# Patient Record
Sex: FEMALE | Race: WHITE | NOT HISPANIC OR LATINO | Employment: UNEMPLOYED | URBAN - METROPOLITAN AREA
[De-identification: names, ages, dates, MRNs, and addresses within clinical notes are randomized per-mention and may not be internally consistent; named-entity substitution may affect disease eponyms.]

---

## 2017-12-15 ENCOUNTER — LAB REQUISITION (OUTPATIENT)
Dept: LAB | Facility: HOSPITAL | Age: 57
End: 2017-12-15

## 2017-12-15 ENCOUNTER — OFFICE VISIT (OUTPATIENT)
Dept: NEUROLOGY | Facility: CLINIC | Age: 57
End: 2017-12-15

## 2017-12-15 VITALS
DIASTOLIC BLOOD PRESSURE: 68 MMHG | BODY MASS INDEX: 20.89 KG/M2 | WEIGHT: 130 LBS | HEIGHT: 66 IN | SYSTOLIC BLOOD PRESSURE: 132 MMHG

## 2017-12-15 DIAGNOSIS — Z00.00 ROUTINE GENERAL MEDICAL EXAMINATION AT A HEALTH CARE FACILITY: ICD-10-CM

## 2017-12-15 DIAGNOSIS — R41.3 MEMORY LOSS: Primary | ICD-10-CM

## 2017-12-15 PROCEDURE — 99205 OFFICE O/P NEW HI 60 MIN: CPT | Performed by: PHYSICIAN ASSISTANT

## 2017-12-15 PROCEDURE — 36415 COLL VENOUS BLD VENIPUNCTURE: CPT | Performed by: PHYSICIAN ASSISTANT

## 2017-12-15 RX ORDER — POTASSIUM CHLORIDE 750 MG/1
TABLET, FILM COATED, EXTENDED RELEASE ORAL
Refills: 0 | Status: ON HOLD | COMMUNITY
Start: 2017-09-15 | End: 2018-10-02

## 2017-12-15 RX ORDER — PAROXETINE HYDROCHLORIDE 40 MG/1
TABLET, FILM COATED ORAL
Refills: 0 | Status: ON HOLD | COMMUNITY
Start: 2017-11-03 | End: 2018-10-02

## 2017-12-15 NOTE — PROGRESS NOTES
"Subjective     Chief Complaint: memory loss    History of Present Illness   Mary Balderas is a 57 y.o. female who comes to clinic today for evaluation of memory loss . She and her family have noted symptoms since at least 2014 marked initially by forgetfulness and repetitiveness . This has gradually worsened  over time. Additional symptoms have included impairments in both short term and long term memory as well as essentially all spheres of cognition. There have been associated  symptoms of significant anxiety and depression. She notes  impairments in ADL's. Her family manages her medications  and finances. She is no longer driving . She is currently residing with her son.     She has previously been followed at  neurology and by Dr. Ruffin. Prior evaluation has included an MRI of the brain. We do not have these results currently. Neuropsychological testing in 5/17 and 4/15 were consistent with anxiety and depression. She has tried donepezil in the past, though this was not beneficial.     It is noted that her mother and maternal aunt were diagnosed with Alzheimer's Disease with onset of symptoms in their early 30's. Her maternal grandmother was also diagnosed with Alzheimer's Disease, though the onset is unclear.      I have reviewed and confirmed the past family, social and medical history as accurate on 12/15/17.     Review of Systems   Constitutional: Negative.    HENT: Negative.    Eyes: Negative.    Respiratory: Negative.    Cardiovascular: Negative.    Gastrointestinal: Negative.    Endocrine: Negative.    Genitourinary: Negative.    Musculoskeletal: Negative.    Skin: Negative.    Allergic/Immunologic: Negative.    Neurological:        As noted in HPI   Hematological: Negative.    Psychiatric/Behavioral:        As noted in HPI       Objective     /68  Ht 167.6 cm (66\")  Wt 59 kg (130 lb)  BMI 20.98 kg/m2    General appearance today is normal.   Peripheral pulses were present and " symmetric.  The ophthalmoscopic exam today is unremarkable. The discs and posterior elements are unremarkable.      Physical Exam   Neurological: She has normal strength. She has a normal Finger-Nose-Finger Test. Gait normal.   Reflex Scores:       Tricep reflexes are 1+ on the right side and 1+ on the left side.       Bicep reflexes are 1+ on the right side and 1+ on the left side.       Brachioradialis reflexes are 1+ on the right side and 1+ on the left side.       Patellar reflexes are 1+ on the right side and 1+ on the left side.  Psychiatric: Her speech is normal.        Neurologic Exam     Mental Status   Oriented to person.   Disoriented to place. (Oriented to state only  )  Disoriented to time. Disoriented to year, month, date, day and season.   Registration: recalls 3 of 3 objects. Recall at 5 minutes: recalls 1 of 3 objects. Follows 1 step commands.   Attention: decreased.   Speech: speech is normal   Level of consciousness: alert  Unable to name object: 1/2 objects. Able to read. Able to repeat. Unable to write. Normal comprehension.     Cranial Nerves   Cranial nerves II through XII intact.     Motor Exam   Muscle bulk: normal  Overall muscle tone: normal    Strength   Strength 5/5 throughout.     Sensory Exam   Light touch normal.     Gait, Coordination, and Reflexes     Gait  Gait: normal    Coordination   Finger to nose coordination: normal    Tremor   Resting tremor: absent    Reflexes   Right brachioradialis: 1+  Left brachioradialis: 1+  Right biceps: 1+  Left biceps: 1+  Right triceps: 1+  Left triceps: 1+  Right patellar: 1+  Left patellar: 1+        Results  MMSE=9      Assessment/Plan   Mary was seen today for memory loss.    Diagnoses and all orders for this visit:    Memory loss  -     Folate  -     Comprehensive Metabolic Panel  -     TSH  -     CBC & Differential  -     Vitamin B12  -     RPR  -     Ammonia          Discussion/Summary   Mary Balderas comes to clinic today for  evaluation of cognitive impairment. Given her history and examination today, including cognitive bedside testing as well as prior workup including neuropsychological testing, it is likely that her symptoms are relate to underlying anxiety and depression. This was discussed at length with the patient and her family. It was elected to obtain records from , including any neuroimaging. It was also elected to obtain screening blood work today. I strongly recommended that she establish care with psychiatry. I have also asked Dinora Ferris,  to contact the family to discuss potential resources. She will then follow up in our clinic on an as needed basis.       I spent 45 minutes out of 60 minutes face to face with the patient and family and discussing diagnosis, prognosis, diagnostic testing, evaluation, current status, driving, treatment options and management.    As part of this visit I reviewed outside records and obtained additional history from the family which is incorporated in the HPI.      Allison Garcia PA-C

## 2017-12-20 ENCOUNTER — TELEPHONE (OUTPATIENT)
Dept: NEUROLOGY | Facility: CLINIC | Age: 57
End: 2017-12-20

## 2017-12-20 NOTE — TELEPHONE ENCOUNTER
----- Message from Allison Garcia PA-C sent at 12/15/2017 11:41 AM EST -----  Would you care to contact Andrey Redd's son to discuss potential resources? Thanks

## 2017-12-21 ENCOUNTER — TELEPHONE (OUTPATIENT)
Dept: NEUROLOGY | Facility: CLINIC | Age: 57
End: 2017-12-21

## 2017-12-21 NOTE — TELEPHONE ENCOUNTER
Son, Sunny and his therapist, Dinora, called together returning my call about arranging respite care services. I gave them the phone number for the [facility] for the intake person to assess their needs and respite programs which she may be eligible. They asked about next steps in finding a diagnosis and treatment plan. I explained I will talk with Allison and Dr. Macario, she is not currently being followed for anxeity or depression though state Dr. Uribe is treating her for depression. She was referred to [email or web address] for therapy and was found to not be a candidate as she cannot participate in therapy sessions and they do not have a prescribing provider. Son is interested in getting referral for psychiatry here in [place]. He asked about disability because she was fired from her job for cognitive reasons per son. I explained he can call the disability office but they will likely need a dx and information from physician. I will talk with Dr. Macario and Allison further about options and will call them if they agree to send referral for psychiatry. He verbalized understanding.

## 2017-12-27 ENCOUNTER — TELEPHONE (OUTPATIENT)
Dept: NEUROLOGY | Facility: CLINIC | Age: 57
End: 2017-12-27

## 2017-12-27 NOTE — TELEPHONE ENCOUNTER
----- Message from Allison Garcia PA-C sent at 12/27/2017 11:01 AM EST -----  Would you care to let the son know that we would recommend Dr. Jose Enrique Horner at [facility], though we would also recommend any other psychiatrist there as well. If that doesn't work, we would recommend [facility]. Thanks!

## 2017-12-27 NOTE — TELEPHONE ENCOUNTER
Tried to contact son with no answer.  On the 3rd apptemt, I did leave VM about Dr Jose Enrique Horner and their contact info.  Explained for him to try them first and any other questions, to contact us back.

## 2018-09-22 ENCOUNTER — HOSPITAL ENCOUNTER (EMERGENCY)
Facility: HOSPITAL | Age: 58
Discharge: HOME | End: 2018-09-22
Attending: EMERGENCY MEDICINE | Admitting: NURSE PRACTITIONER

## 2018-09-22 VITALS
OXYGEN SATURATION: 98 % | WEIGHT: 150 LBS | RESPIRATION RATE: 18 BRPM | HEIGHT: 65 IN | DIASTOLIC BLOOD PRESSURE: 83 MMHG | SYSTOLIC BLOOD PRESSURE: 140 MMHG | HEART RATE: 77 BPM | BODY MASS INDEX: 24.99 KG/M2 | TEMPERATURE: 97.3 F

## 2018-09-22 DIAGNOSIS — F41.9 SEVERE ANXIETY: Primary | ICD-10-CM

## 2018-09-22 LAB
6-ACETYL MORPHINE: NEGATIVE
ALBUMIN SERPL-MCNC: 4.4 G/DL (ref 3.5–5)
ALBUMIN/GLOB SERPL: 1.3 G/DL (ref 1.5–2.5)
ALP SERPL-CCNC: 127 U/L (ref 35–104)
ALT SERPL W P-5'-P-CCNC: 24 U/L (ref 10–36)
AMPHET+METHAMPHET UR QL: NEGATIVE
ANION GAP SERPL CALCULATED.3IONS-SCNC: 7.9 MMOL/L (ref 3.6–11.2)
APAP SERPL-MCNC: <10 MCG/ML (ref 0–200)
AST SERPL-CCNC: 35 U/L (ref 10–30)
BARBITURATES UR QL SCN: NEGATIVE
BASOPHILS # BLD AUTO: 0.04 10*3/MM3 (ref 0–0.3)
BASOPHILS NFR BLD AUTO: 0.4 % (ref 0–2)
BENZODIAZ UR QL SCN: NEGATIVE
BILIRUB SERPL-MCNC: 0.3 MG/DL (ref 0.2–1.8)
BILIRUB UR QL STRIP: NEGATIVE
BUN BLD-MCNC: 11 MG/DL (ref 7–21)
BUN/CREAT SERPL: 12.4 (ref 7–25)
BUPRENORPHINE SERPL-MCNC: NEGATIVE NG/ML
CALCIUM SPEC-SCNC: 9.2 MG/DL (ref 7.7–10)
CANNABINOIDS SERPL QL: POSITIVE
CHLORIDE SERPL-SCNC: 108 MMOL/L (ref 99–112)
CLARITY UR: CLEAR
CO2 SERPL-SCNC: 26.1 MMOL/L (ref 24.3–31.9)
COCAINE UR QL: NEGATIVE
COLOR UR: YELLOW
CREAT BLD-MCNC: 0.89 MG/DL (ref 0.43–1.29)
DEPRECATED RDW RBC AUTO: 37.1 FL (ref 37–54)
EOSINOPHIL # BLD AUTO: 0.03 10*3/MM3 (ref 0–0.7)
EOSINOPHIL NFR BLD AUTO: 0.3 % (ref 0–5)
ERYTHROCYTE [DISTWIDTH] IN BLOOD BY AUTOMATED COUNT: 12 % (ref 11.5–14.5)
ETHANOL BLD-MCNC: <10 MG/DL
ETHANOL UR QL: <0.01 %
GFR SERPL CREATININE-BSD FRML MDRD: 66 ML/MIN/1.73
GLOBULIN UR ELPH-MCNC: 3.4 GM/DL
GLUCOSE BLD-MCNC: 108 MG/DL (ref 70–110)
GLUCOSE UR STRIP-MCNC: NEGATIVE MG/DL
HCT VFR BLD AUTO: 38.3 % (ref 37–47)
HGB BLD-MCNC: 12.7 G/DL (ref 12–16)
HGB UR QL STRIP.AUTO: NEGATIVE
IMM GRANULOCYTES # BLD: 0.01 10*3/MM3 (ref 0–0.03)
IMM GRANULOCYTES NFR BLD: 0.1 % (ref 0–0.5)
KETONES UR QL STRIP: NEGATIVE
LEUKOCYTE ESTERASE UR QL STRIP.AUTO: NEGATIVE
LYMPHOCYTES # BLD AUTO: 0.6 10*3/MM3 (ref 1–3)
LYMPHOCYTES NFR BLD AUTO: 6.3 % (ref 21–51)
MAGNESIUM SERPL-MCNC: 2.1 MG/DL (ref 1.7–2.6)
MCH RBC QN AUTO: 28.5 PG (ref 27–33)
MCHC RBC AUTO-ENTMCNC: 33.2 G/DL (ref 33–37)
MCV RBC AUTO: 85.9 FL (ref 80–94)
METHADONE UR QL SCN: NEGATIVE
MONOCYTES # BLD AUTO: 0.56 10*3/MM3 (ref 0.1–0.9)
MONOCYTES NFR BLD AUTO: 5.9 % (ref 0–10)
NEUTROPHILS # BLD AUTO: 8.31 10*3/MM3 (ref 1.4–6.5)
NEUTROPHILS NFR BLD AUTO: 87 % (ref 30–70)
NITRITE UR QL STRIP: NEGATIVE
OPIATES UR QL: NEGATIVE
OSMOLALITY SERPL CALC.SUM OF ELEC: 283 MOSM/KG (ref 273–305)
OXYCODONE UR QL SCN: NEGATIVE
PCP UR QL SCN: NEGATIVE
PH UR STRIP.AUTO: 8.5 [PH] (ref 5–8)
PLATELET # BLD AUTO: 322 10*3/MM3 (ref 130–400)
PMV BLD AUTO: 9.9 FL (ref 6–10)
POTASSIUM BLD-SCNC: 3 MMOL/L (ref 3.5–5.3)
PROT SERPL-MCNC: 7.8 G/DL (ref 6–8)
PROT UR QL STRIP: NEGATIVE
RBC # BLD AUTO: 4.46 10*6/MM3 (ref 4.2–5.4)
SALICYLATES SERPL-MCNC: <1 MG/DL (ref 0–30)
SODIUM BLD-SCNC: 142 MMOL/L (ref 135–153)
SP GR UR STRIP: 1.01 (ref 1–1.03)
UROBILINOGEN UR QL STRIP: ABNORMAL
WBC NRBC COR # BLD: 9.55 10*3/MM3 (ref 4.5–12.5)

## 2018-09-22 PROCEDURE — 80307 DRUG TEST PRSMV CHEM ANLYZR: CPT | Performed by: NURSE PRACTITIONER

## 2018-09-22 PROCEDURE — 83735 ASSAY OF MAGNESIUM: CPT | Performed by: NURSE PRACTITIONER

## 2018-09-22 PROCEDURE — 80053 COMPREHEN METABOLIC PANEL: CPT | Performed by: NURSE PRACTITIONER

## 2018-09-22 PROCEDURE — 85025 COMPLETE CBC W/AUTO DIFF WBC: CPT | Performed by: NURSE PRACTITIONER

## 2018-09-22 PROCEDURE — 81003 URINALYSIS AUTO W/O SCOPE: CPT | Performed by: NURSE PRACTITIONER

## 2018-09-22 PROCEDURE — 99283 EMERGENCY DEPT VISIT LOW MDM: CPT

## 2018-09-22 PROCEDURE — 36415 COLL VENOUS BLD VENIPUNCTURE: CPT

## 2018-09-22 RX ORDER — LORAZEPAM 1 MG/1
1 TABLET ORAL EVERY 8 HOURS PRN
Qty: 8 TABLET | Refills: 0 | Status: ON HOLD | OUTPATIENT
Start: 2018-09-22 | End: 2018-10-02

## 2018-09-22 RX ORDER — POTASSIUM CHLORIDE 1.5 G/1.77G
40 POWDER, FOR SOLUTION ORAL ONCE
Status: COMPLETED | OUTPATIENT
Start: 2018-09-22 | End: 2018-09-22

## 2018-09-22 RX ORDER — LORAZEPAM 1 MG/1
1 TABLET ORAL ONCE
Status: COMPLETED | OUTPATIENT
Start: 2018-09-22 | End: 2018-09-22

## 2018-09-22 RX ADMIN — POTASSIUM CHLORIDE 40 MEQ: 1.5 POWDER, FOR SOLUTION ORAL at 20:24

## 2018-09-22 RX ADMIN — LORAZEPAM 1 MG: 1 TABLET ORAL at 17:44

## 2018-09-23 NOTE — NURSING NOTE
Pt states she does not want admission to [facility], and adamantly denies s/i, h/i; states she has said she wants to die, but because she feels abandoned and unloved by her sons, and she would not harm herself. Pt and her friend state she has appointment on mon morning at 8 am with comp care for eval and possibly meds. Her friend states pt is not a threat to herself or others, is safe living with her at this time, and they will keep appoint on mon. Advised if any change in status, to return to ed for eval. Pt agreeable to this. Phone contact with dr early, who is in agreement with this plan. Ed provider made aware. Pt given rx and d/c instructions.

## 2018-09-23 NOTE — ED PROVIDER NOTES
Subjective     History provided by:  Patient  Mental Health Problem   Presenting symptoms: agitation and disorganized speech    Patient accompanied by:  Caregiver  Degree of incapacity (severity):  Moderate  Onset quality:  Gradual  Timing:  Constant  Progression:  Worsening  Chronicity:  Recurrent  Treatment compliance:  Untreated  Relieved by:  None tried  Worsened by:  Nothing  Ineffective treatments:  None tried  Associated symptoms: anxiety, feelings of worthlessness and irritability    Associated symptoms: no abdominal pain and no chest pain        Review of Systems   Constitutional: Positive for irritability. Negative for fever.   HENT: Negative.    Respiratory: Negative.    Cardiovascular: Negative.  Negative for chest pain.   Gastrointestinal: Negative.  Negative for abdominal pain.   Endocrine: Negative.    Genitourinary: Negative.  Negative for dysuria.   Skin: Negative.    Neurological: Negative.    Psychiatric/Behavioral: Positive for agitation. The patient is nervous/anxious.    All other systems reviewed and are negative.      Past Medical History:   Diagnosis Date   • Anxiety    • Depression    • Memory change        Allergies   Allergen Reactions   • Sulfa Antibiotics        History reviewed. No pertinent surgical history.    Family History   Problem Relation Age of Onset   • Alzheimer's disease Mother    • Cancer Paternal Uncle        Social History     Social History   • Marital status: Single     Social History Main Topics   • Smoking status: Never Smoker   • Smokeless tobacco: Never Used   • Alcohol use No   • Drug use: No     Other Topics Concern   • Not on file           Objective   Physical Exam   Constitutional: She is oriented to person, place, and time. She appears well-developed and well-nourished. No distress.   HENT:   Head: Normocephalic and atraumatic.   Right Ear: External ear normal.   Left Ear: External ear normal.   Nose: Nose normal.   Eyes: Pupils are equal, round, and reactive  to light. Conjunctivae and EOM are normal.   Neck: Normal range of motion. Neck supple. No JVD present. No tracheal deviation present.   Cardiovascular: Normal rate, regular rhythm and normal heart sounds.    No murmur heard.  Pulmonary/Chest: Effort normal and breath sounds normal. No respiratory distress. She has no wheezes.   Abdominal: Soft. Bowel sounds are normal. There is no tenderness.   Musculoskeletal: Normal range of motion. She exhibits no edema or deformity.   Neurological: She is alert and oriented to person, place, and time. No cranial nerve deficit.   Skin: Skin is warm and dry. No rash noted. She is not diaphoretic. No erythema. No pallor.   Psychiatric: She has a normal mood and affect. Her behavior is normal. Thought content normal.   Nursing note and vitals reviewed.      Procedures           ED Course                  MDM  Number of Diagnoses or Management Options  Severe anxiety: new and does not require workup     Amount and/or Complexity of Data Reviewed  Clinical lab tests: reviewed    Risk of Complications, Morbidity, and/or Mortality  Presenting problems: low  Diagnostic procedures: low  Management options: low          Final diagnoses:   Severe anxiety            Flores Vieira, APRN  09/23/18 [id number]

## 2018-10-01 ENCOUNTER — HOSPITAL ENCOUNTER (EMERGENCY)
Facility: HOSPITAL | Age: 58
Discharge: STILL ADMITTED OR ADMINISTRATIVE | End: 2018-10-02
Attending: FAMILY MEDICINE

## 2018-10-01 DIAGNOSIS — F32.3 CURRENT SEVERE EPISODE OF MAJOR DEPRESSIVE DISORDER WITH PSYCHOTIC FEATURES, UNSPECIFIED WHETHER RECURRENT (HCC): Primary | ICD-10-CM

## 2018-10-01 LAB
6-ACETYL MORPHINE: NEGATIVE
ALBUMIN SERPL-MCNC: 4.4 G/DL (ref 3.5–5)
ALBUMIN/GLOB SERPL: 1.3 G/DL (ref 1.5–2.5)
ALP SERPL-CCNC: 121 U/L (ref 35–104)
ALT SERPL W P-5'-P-CCNC: 27 U/L (ref 10–36)
AMMONIA BLD-SCNC: 21 UMOL/L (ref 11–51)
AMPHET+METHAMPHET UR QL: NEGATIVE
ANION GAP SERPL CALCULATED.3IONS-SCNC: 8.8 MMOL/L (ref 3.6–11.2)
AST SERPL-CCNC: 30 U/L (ref 10–30)
BACTERIA UR QL AUTO: ABNORMAL /HPF
BARBITURATES UR QL SCN: NEGATIVE
BASOPHILS # BLD AUTO: 0.04 10*3/MM3 (ref 0–0.3)
BASOPHILS NFR BLD AUTO: 0.7 % (ref 0–2)
BENZODIAZ UR QL SCN: NEGATIVE
BILIRUB SERPL-MCNC: 0.8 MG/DL (ref 0.2–1.8)
BILIRUB UR QL STRIP: NEGATIVE
BUN BLD-MCNC: 10 MG/DL (ref 7–21)
BUN/CREAT SERPL: 13.2 (ref 7–25)
BUPRENORPHINE SERPL-MCNC: NEGATIVE NG/ML
CALCIUM SPEC-SCNC: 9.2 MG/DL (ref 7.7–10)
CANNABINOIDS SERPL QL: POSITIVE
CHLORIDE SERPL-SCNC: 109 MMOL/L (ref 99–112)
CLARITY UR: CLEAR
CO2 SERPL-SCNC: 23.2 MMOL/L (ref 24.3–31.9)
COCAINE UR QL: NEGATIVE
COLOR UR: YELLOW
CREAT BLD-MCNC: 0.76 MG/DL (ref 0.43–1.29)
DEPRECATED RDW RBC AUTO: 37.9 FL (ref 37–54)
EOSINOPHIL # BLD AUTO: 0.18 10*3/MM3 (ref 0–0.7)
EOSINOPHIL NFR BLD AUTO: 3.2 % (ref 0–5)
ERYTHROCYTE [DISTWIDTH] IN BLOOD BY AUTOMATED COUNT: 12.3 % (ref 11.5–14.5)
GFR SERPL CREATININE-BSD FRML MDRD: 79 ML/MIN/1.73
GLOBULIN UR ELPH-MCNC: 3.4 GM/DL
GLUCOSE BLD-MCNC: 81 MG/DL (ref 70–110)
GLUCOSE UR STRIP-MCNC: NEGATIVE MG/DL
HCT VFR BLD AUTO: 41.2 % (ref 37–47)
HGB BLD-MCNC: 13.8 G/DL (ref 12–16)
HGB UR QL STRIP.AUTO: NEGATIVE
HYALINE CASTS UR QL AUTO: ABNORMAL /LPF
IMM GRANULOCYTES # BLD: 0.01 10*3/MM3 (ref 0–0.03)
IMM GRANULOCYTES NFR BLD: 0.2 % (ref 0–0.5)
KETONES UR QL STRIP: NEGATIVE
LEUKOCYTE ESTERASE UR QL STRIP.AUTO: ABNORMAL
LYMPHOCYTES # BLD AUTO: 2 10*3/MM3 (ref 1–3)
LYMPHOCYTES NFR BLD AUTO: 35.1 % (ref 21–51)
MCH RBC QN AUTO: 28.4 PG (ref 27–33)
MCHC RBC AUTO-ENTMCNC: 33.5 G/DL (ref 33–37)
MCV RBC AUTO: 84.8 FL (ref 80–94)
METHADONE UR QL SCN: NEGATIVE
MONOCYTES # BLD AUTO: 0.63 10*3/MM3 (ref 0.1–0.9)
MONOCYTES NFR BLD AUTO: 11.1 % (ref 0–10)
NEUTROPHILS # BLD AUTO: 2.84 10*3/MM3 (ref 1.4–6.5)
NEUTROPHILS NFR BLD AUTO: 49.7 % (ref 30–70)
NITRITE UR QL STRIP: NEGATIVE
OPIATES UR QL: NEGATIVE
OSMOLALITY SERPL CALC.SUM OF ELEC: 279.3 MOSM/KG (ref 273–305)
OXYCODONE UR QL SCN: NEGATIVE
PCP UR QL SCN: NEGATIVE
PH UR STRIP.AUTO: 7 [PH] (ref 5–8)
PLATELET # BLD AUTO: 279 10*3/MM3 (ref 130–400)
PMV BLD AUTO: 9.8 FL (ref 6–10)
POTASSIUM BLD-SCNC: 3 MMOL/L (ref 3.5–5.3)
PROT SERPL-MCNC: 7.8 G/DL (ref 6–8)
PROT UR QL STRIP: NEGATIVE
RBC # BLD AUTO: 4.86 10*6/MM3 (ref 4.2–5.4)
RBC # UR: ABNORMAL /HPF
REF LAB TEST METHOD: ABNORMAL
SODIUM BLD-SCNC: 141 MMOL/L (ref 135–153)
SP GR UR STRIP: 1.01 (ref 1–1.03)
SQUAMOUS #/AREA URNS HPF: ABNORMAL /HPF
UROBILINOGEN UR QL STRIP: ABNORMAL
WBC NRBC COR # BLD: 5.7 10*3/MM3 (ref 4.5–12.5)
WBC UR QL AUTO: ABNORMAL /HPF

## 2018-10-01 PROCEDURE — 80307 DRUG TEST PRSMV CHEM ANLYZR: CPT | Performed by: FAMILY MEDICINE

## 2018-10-01 PROCEDURE — 81001 URINALYSIS AUTO W/SCOPE: CPT | Performed by: FAMILY MEDICINE

## 2018-10-01 PROCEDURE — 82140 ASSAY OF AMMONIA: CPT | Performed by: FAMILY MEDICINE

## 2018-10-01 PROCEDURE — 85025 COMPLETE CBC W/AUTO DIFF WBC: CPT | Performed by: FAMILY MEDICINE

## 2018-10-01 PROCEDURE — 80053 COMPREHEN METABOLIC PANEL: CPT | Performed by: FAMILY MEDICINE

## 2018-10-01 PROCEDURE — 25010000002 LORAZEPAM PER 2 MG: Performed by: FAMILY MEDICINE

## 2018-10-01 RX ORDER — LORAZEPAM 2 MG/ML
1 INJECTION INTRAMUSCULAR ONCE
Status: DISCONTINUED | OUTPATIENT
Start: 2018-10-01 | End: 2018-10-01

## 2018-10-01 RX ORDER — POTASSIUM CHLORIDE 1.5 G/1.77G
40 POWDER, FOR SOLUTION ORAL 2 TIMES DAILY
Status: DISCONTINUED | OUTPATIENT
Start: 2018-10-01 | End: 2018-10-02 | Stop reason: HOSPADM

## 2018-10-01 RX ORDER — LORAZEPAM 2 MG/ML
INJECTION INTRAMUSCULAR
Status: DISCONTINUED
Start: 2018-10-01 | End: 2018-10-02 | Stop reason: HOSPADM

## 2018-10-01 RX ORDER — LORAZEPAM 2 MG/ML
1 INJECTION INTRAMUSCULAR ONCE
Status: COMPLETED | OUTPATIENT
Start: 2018-10-01 | End: 2018-10-01

## 2018-10-01 RX ORDER — ESCITALOPRAM OXALATE 10 MG/1
10 TABLET ORAL DAILY
COMMUNITY

## 2018-10-01 RX ADMIN — LORAZEPAM 1 MG: 2 INJECTION, SOLUTION INTRAMUSCULAR; INTRAVENOUS at 20:30

## 2018-10-02 ENCOUNTER — HOSPITAL ENCOUNTER (INPATIENT)
Facility: HOSPITAL | Age: 58
LOS: 6 days | Discharge: HOME | End: 2018-10-08
Attending: PSYCHIATRY & NEUROLOGY | Admitting: PSYCHIATRY & NEUROLOGY

## 2018-10-02 VITALS
TEMPERATURE: 97.2 F | RESPIRATION RATE: 18 BRPM | HEIGHT: 66 IN | OXYGEN SATURATION: 97 % | SYSTOLIC BLOOD PRESSURE: 110 MMHG | BODY MASS INDEX: 19.29 KG/M2 | HEART RATE: 77 BPM | DIASTOLIC BLOOD PRESSURE: 69 MMHG | WEIGHT: 120 LBS

## 2018-10-02 PROBLEM — F32.9 MAJOR DEPRESSIVE DISORDER: Status: ACTIVE | Noted: 2018-10-02

## 2018-10-02 LAB
ALBUMIN SERPL-MCNC: 3.8 G/DL (ref 3.5–5)
ALBUMIN/GLOB SERPL: 1.3 G/DL (ref 1.5–2.5)
ALP SERPL-CCNC: 106 U/L (ref 35–104)
ALT SERPL W P-5'-P-CCNC: 24 U/L (ref 10–36)
ANION GAP SERPL CALCULATED.3IONS-SCNC: 5.9 MMOL/L (ref 3.6–11.2)
AST SERPL-CCNC: 30 U/L (ref 10–30)
BILIRUB SERPL-MCNC: 0.5 MG/DL (ref 0.2–1.8)
BUN BLD-MCNC: 10 MG/DL (ref 7–21)
BUN/CREAT SERPL: 15.4 (ref 7–25)
CALCIUM SPEC-SCNC: 8.6 MG/DL (ref 7.7–10)
CHLORIDE SERPL-SCNC: 109 MMOL/L (ref 99–112)
CO2 SERPL-SCNC: 25.1 MMOL/L (ref 24.3–31.9)
CREAT BLD-MCNC: 0.65 MG/DL (ref 0.43–1.29)
GFR SERPL CREATININE-BSD FRML MDRD: 94 ML/MIN/1.73
GLOBULIN UR ELPH-MCNC: 3 GM/DL
GLUCOSE BLD-MCNC: 87 MG/DL (ref 70–110)
OSMOLALITY SERPL CALC.SUM OF ELEC: 277.8 MOSM/KG (ref 273–305)
POTASSIUM BLD-SCNC: 3.6 MMOL/L (ref 3.5–5.3)
PROT SERPL-MCNC: 6.8 G/DL (ref 6–8)
SODIUM BLD-SCNC: 140 MMOL/L (ref 135–153)

## 2018-10-02 PROCEDURE — 25010000002 HALOPERIDOL LACTATE PER 5 MG: Performed by: PSYCHIATRY & NEUROLOGY

## 2018-10-02 PROCEDURE — 93005 ELECTROCARDIOGRAM TRACING: CPT | Performed by: PSYCHIATRY & NEUROLOGY

## 2018-10-02 PROCEDURE — 25010000002 DIPHENHYDRAMINE PER 50 MG: Performed by: PSYCHIATRY & NEUROLOGY

## 2018-10-02 PROCEDURE — 80053 COMPREHEN METABOLIC PANEL: CPT | Performed by: PSYCHIATRY & NEUROLOGY

## 2018-10-02 PROCEDURE — 93010 ELECTROCARDIOGRAM REPORT: CPT | Performed by: INTERNAL MEDICINE

## 2018-10-02 PROCEDURE — 25010000002 LORAZEPAM PER 2 MG: Performed by: PSYCHIATRY & NEUROLOGY

## 2018-10-02 RX ORDER — HYDROXYZINE 50 MG/1
50 TABLET, FILM COATED ORAL EVERY 6 HOURS PRN
Status: DISCONTINUED | OUTPATIENT
Start: 2018-10-02 | End: 2018-10-08 | Stop reason: HOSPADM

## 2018-10-02 RX ORDER — CHOLECALCIFEROL (VITAMIN D3) 50 MCG
2000 TABLET ORAL DAILY
COMMUNITY

## 2018-10-02 RX ORDER — LOPERAMIDE HYDROCHLORIDE 2 MG/1
2 CAPSULE ORAL 4 TIMES DAILY PRN
Status: DISCONTINUED | OUTPATIENT
Start: 2018-10-02 | End: 2018-10-08 | Stop reason: HOSPADM

## 2018-10-02 RX ORDER — ESCITALOPRAM OXALATE 10 MG/1
10 TABLET ORAL DAILY
Status: DISCONTINUED | OUTPATIENT
Start: 2018-10-02 | End: 2018-10-08 | Stop reason: HOSPADM

## 2018-10-02 RX ORDER — LORAZEPAM 2 MG/ML
1 INJECTION INTRAMUSCULAR EVERY 4 HOURS PRN
Status: DISCONTINUED | OUTPATIENT
Start: 2018-10-02 | End: 2018-10-08 | Stop reason: HOSPADM

## 2018-10-02 RX ORDER — ALENDRONATE SODIUM 70 MG/1
70 TABLET ORAL WEEKLY
COMMUNITY

## 2018-10-02 RX ORDER — HALOPERIDOL 2 MG/1
2 TABLET ORAL EVERY 4 HOURS PRN
Status: DISCONTINUED | OUTPATIENT
Start: 2018-10-02 | End: 2018-10-08 | Stop reason: HOSPADM

## 2018-10-02 RX ORDER — IBUPROFEN 600 MG/1
600 TABLET ORAL EVERY 6 HOURS PRN
Status: DISCONTINUED | OUTPATIENT
Start: 2018-10-02 | End: 2018-10-08 | Stop reason: HOSPADM

## 2018-10-02 RX ORDER — ONDANSETRON 4 MG/1
4 TABLET, FILM COATED ORAL EVERY 6 HOURS PRN
Status: DISCONTINUED | OUTPATIENT
Start: 2018-10-02 | End: 2018-10-08 | Stop reason: HOSPADM

## 2018-10-02 RX ORDER — LORAZEPAM 1 MG/1
1 TABLET ORAL EVERY 8 HOURS PRN
Status: CANCELLED | OUTPATIENT
Start: 2018-10-02

## 2018-10-02 RX ORDER — DONEPEZIL HYDROCHLORIDE 5 MG/1
10 TABLET, FILM COATED ORAL DAILY
Status: DISCONTINUED | OUTPATIENT
Start: 2018-10-02 | End: 2018-10-08 | Stop reason: HOSPADM

## 2018-10-02 RX ORDER — TRAZODONE HYDROCHLORIDE 50 MG/1
50 TABLET ORAL NIGHTLY PRN
Status: DISCONTINUED | OUTPATIENT
Start: 2018-10-02 | End: 2018-10-08 | Stop reason: HOSPADM

## 2018-10-02 RX ORDER — DIPHENHYDRAMINE HYDROCHLORIDE 50 MG/ML
25 INJECTION INTRAMUSCULAR; INTRAVENOUS EVERY 4 HOURS PRN
Status: DISCONTINUED | OUTPATIENT
Start: 2018-10-02 | End: 2018-10-04

## 2018-10-02 RX ORDER — LORAZEPAM 1 MG/1
1 TABLET ORAL EVERY 4 HOURS PRN
Status: DISCONTINUED | OUTPATIENT
Start: 2018-10-02 | End: 2018-10-08 | Stop reason: HOSPADM

## 2018-10-02 RX ORDER — FAMOTIDINE 20 MG/1
20 TABLET, FILM COATED ORAL 2 TIMES DAILY PRN
Status: DISCONTINUED | OUTPATIENT
Start: 2018-10-02 | End: 2018-10-08 | Stop reason: HOSPADM

## 2018-10-02 RX ORDER — ECHINACEA PURPUREA EXTRACT 125 MG
2 TABLET ORAL AS NEEDED
Status: DISCONTINUED | OUTPATIENT
Start: 2018-10-02 | End: 2018-10-08 | Stop reason: HOSPADM

## 2018-10-02 RX ORDER — BENZONATATE 100 MG/1
100 CAPSULE ORAL 3 TIMES DAILY PRN
Status: DISCONTINUED | OUTPATIENT
Start: 2018-10-02 | End: 2018-10-08 | Stop reason: HOSPADM

## 2018-10-02 RX ORDER — DONEPEZIL HYDROCHLORIDE 10 MG/1
10 TABLET, FILM COATED ORAL DAILY
COMMUNITY

## 2018-10-02 RX ORDER — BENZTROPINE MESYLATE 1 MG/1
1 TABLET ORAL DAILY PRN
Status: DISCONTINUED | OUTPATIENT
Start: 2018-10-02 | End: 2018-10-08 | Stop reason: HOSPADM

## 2018-10-02 RX ORDER — BENZTROPINE MESYLATE 1 MG/ML
0.5 INJECTION INTRAMUSCULAR; INTRAVENOUS DAILY PRN
Status: DISCONTINUED | OUTPATIENT
Start: 2018-10-02 | End: 2018-10-08 | Stop reason: HOSPADM

## 2018-10-02 RX ORDER — OMEGA-3S/DHA/EPA/FISH OIL/D3 300MG-1000
2000 CAPSULE ORAL DAILY
Status: DISCONTINUED | OUTPATIENT
Start: 2018-10-02 | End: 2018-10-08 | Stop reason: HOSPADM

## 2018-10-02 RX ORDER — HALOPERIDOL 5 MG/ML
2 INJECTION INTRAMUSCULAR EVERY 4 HOURS PRN
Status: DISCONTINUED | OUTPATIENT
Start: 2018-10-02 | End: 2018-10-04

## 2018-10-02 RX ORDER — DIPHENHYDRAMINE HCL 25 MG
25 CAPSULE ORAL EVERY 4 HOURS PRN
Status: DISCONTINUED | OUTPATIENT
Start: 2018-10-02 | End: 2018-10-08 | Stop reason: HOSPADM

## 2018-10-02 RX ORDER — ALUMINA, MAGNESIA, AND SIMETHICONE 2400; 2400; 240 MG/30ML; MG/30ML; MG/30ML
15 SUSPENSION ORAL EVERY 6 HOURS PRN
Status: DISCONTINUED | OUTPATIENT
Start: 2018-10-02 | End: 2018-10-08 | Stop reason: HOSPADM

## 2018-10-02 RX ADMIN — ESCITALOPRAM 10 MG: 10 TABLET, FILM COATED ORAL at 13:38

## 2018-10-02 RX ADMIN — LORAZEPAM 1 MG: 1 TABLET ORAL at 14:14

## 2018-10-02 RX ADMIN — HALOPERIDOL LACTATE 2 MG: 5 INJECTION, SOLUTION INTRAMUSCULAR at 19:07

## 2018-10-02 RX ADMIN — POTASSIUM CHLORIDE 40 MEQ: 1.5 POWDER, FOR SOLUTION ORAL at 00:47

## 2018-10-02 RX ADMIN — CHOLECALCIFEROL TAB 10 MCG (400 UNIT) 2000 UNITS: 10 TAB at 13:37

## 2018-10-02 RX ADMIN — DONEPEZIL HYDROCHLORIDE 10 MG: 5 TABLET, FILM COATED ORAL at 13:37

## 2018-10-02 RX ADMIN — LORAZEPAM 1 MG: 2 INJECTION INTRAMUSCULAR; INTRAVENOUS at 19:07

## 2018-10-02 RX ADMIN — DIPHENHYDRAMINE HYDROCHLORIDE 25 MG: 50 INJECTION, SOLUTION INTRAMUSCULAR; INTRAVENOUS at 19:06

## 2018-10-02 NOTE — PLAN OF CARE
Problem: Patient Care Overview  Goal: Plan of Care Review  Outcome: Ongoing (interventions implemented as appropriate)   10/02/18 1426   Coping/Psychosocial   Plan of Care Reviewed With patient   Coping/Psychosocial   Patient Agreement with Plan of Care agrees   Coping/Psychosocial   Consent Given to Review Plan with Patient declined.    Plan of Care Review   Progress no change   OTHER   Outcome Summary Completed initial assessment, discussed alternative aftercare resources and expectations of treatment; reviewed treatment plan.     Goal: Individualization and Mutuality  Outcome: Ongoing (interventions implemented as appropriate)   10/02/18 1426   Personal Strengths/Vulnerabilities   Patient Personal Strengths resilient;motivated for treatment;spiritual/Judaism support;resourceful   Patient Vulnerabilities Ineffective coping skills, poor insight.   Individualization   Patient Specific Preferences Mood stabilization.   Patient Specific Goals (Include Timeframe) Identify 3 healthy coping skills, deny all SI, HI, and AVH prior to discharge.   Patient Specific Interventions Patient to access psychiatric evaluation, medication management, individual and group therapy during admission.   Mutuality/Individual Preferences   What Anxieties, Fears, Concerns, or Questions Do You Have About Your Care? None   What Information Would Help Us Give You More Personalized Care? None     Goal: Discharge Needs Assessment  Outcome: Ongoing (interventions implemented as appropriate)   10/02/18 1426   Discharge Needs Assessment   Readmission Within the Last 30 Days no previous admission in last 30 days   Concerns to be Addressed decision making;coping/stress;discharge planning;medication;other (see comments);relationship;mental health; homicidal ideation.  (Bizarre behavior.)   Patient/Family Anticipates Transition to home   Patient/Family Anticipated Services at Transition outpatient care;mental health services   Transportation  Anticipated family or friend will provide   Patient's Choice of Community Agency(s) Anticipate [facility] referral.   Current Discharge Risk psychiatric illness;lack of support system/caregiver   Discharge Coordination/Progress Patient anticipated to have [facility] and return home upon dsicharge. patient has [employer] insurance.   Discharge Needs Assessment,    Outpatient/Agency/Support Group Needs outpatient counseling;outpatient medication management;outpatient psychiatric care (specify)   Anticipated Discharge Disposition home or self-care     Goal: Interprofessional Rounds/Family Conf  Outcome: Ongoing (interventions implemented as appropriate)   10/02/18 1426   Interdisciplinary Rounds/Family Conf   Summary Therapist to staff patient's case with treatment team during admission.   Interdisciplinary Rounds/Family Conf   Participants psychiatrist;nursing;social work     DATA:           Therapist met individually with patient this date to introduce role and to discuss hospitalization expectations. Patient agreeable.        Therapist completed integrated summary, treatment plan, and initiated social history this date.     Patient declined family involvement.  Per review of chart, patient's son is POA.        ASSESSMENT:       Mary is a 58 year-old ,  [place].  Patient referred by police due to combative behavior.  She also reported homicidal ideations toward her son and duty to warn completed by intake RN.  She presents as voluntary admit with reports of bizarre behavior, confusion, disorientation.  Per review of chart, patient suffers from dementia and become combative with family prior to admission.  Also noted that patient refused to clothe herself and running around outside her home per family report.  Patient recently discharged from [facility] two weeks ago.  She appears to be poor historian and not reliable.  Patient appeared  to display flat affect and anxious mood.  She appeared tearful at times and frequently request to go home.  Patient oriented to person and place.  She seemed confused and displayed limited insight.  Patient UDS normal and she denied legal issues.  Patient endorsed feeling hopeless and helpless.  She denied SI, HI, and AVH to this therapist. Patient discussed she has been living with a friend, Giovany, in [place] and plans to return there upon discharge.          PLAN:       Treatment team will focus efforts on stabilizing patient's acute symptoms while providing education on healthy coping and crisis management to reduce hospitalizations. Patient requires daily psychiatrist evaluation and 24/7 nursing supervision to promote patient safety.      Therapist will offer 1-4 individual sessions (20-30 minutes each), 1 therapy group daily, family education, and appropriate referral.      Patient anticipated to have follow up with MAE[facility] Felder and return home upon discharge.

## 2018-10-02 NOTE — H&P
"Patient Care Team:  Gabriela Uribe MD as PCP - General (Family Medicine)    Chief Complaint: \"I don't know, I want to go home\"    Subjective         History of Present Illness: Patient is a 58-year-old female,, she herself cannot give her age but can tell her date of birth as \"[birth year]\", she says she has never been  and has no children, she tells me she lives with her mother whose name is Joanna Frederick,  she was admitted on 10/2/2018 after she was brought to the emergency room by her friends because patient became upset in the evening, became combative, refused to dress after a shower, ran through the house and was throwing things.  Police was called and she was brought to the emergency room.  Patient was discharged to the friend's home from [facility] 2 weeks ago    I saw the patient on 10/2/2018 when she listed his chief complaint as described above, she remained restless in her chair, and would not give any other information    Substance Abuse History: Denied any      Past Psychiatric History: He shouldn't unable to give any information      History she says she does not know her physician's name and was unable to give any other information  Past Medical History:   Diagnosis Date   • Anxiety    • Dementia    • Depression    • Memory change      History reviewed. No pertinent surgical history.  Family History   Problem Relation Age of Onset   • Alzheimer's disease Mother    • Cancer Paternal Uncle      Social History   Substance Use Topics   • Smoking status: Never Smoker   • Smokeless tobacco: Never Used   • Alcohol use No     Prescriptions Prior to Admission   Medication Sig Dispense Refill Last Dose   • alendronate (FOSAMAX) 70 MG tablet Take 70 mg by mouth 1 (One) Time Per Week.   Unknown at Unknown time   • Cholecalciferol (VITAMIN D) 2000 units tablet Take 2,000 Units by mouth Daily.   Unknown at Unknown time   • donepezil (ARICEPT) 10 MG tablet Take 10 mg by mouth Daily.   " Unknown at Unknown time   • escitalopram (LEXAPRO) 10 MG tablet Take 10 mg by mouth Daily.   Unknown at Unknown time     Allergies:  Sulfa antibiotics  Family history no information could be obtained  Personal history no information could be obtained  Review of Systems:     Constitution: No complaints  Eyes: No complaints  ENT: No complaints    Respiratory: No complaints   Cardiovascular: No complaints  Gastrointestinal: No complaints  Genitourinary no complaints:   Musculoskeletal: No complaints  Neurological: No complaints  :     Mental Status Exam:    Hygiene:   fair  Cooperation: Cooperative but unable to give any information  Eye Contact:  Fair  Psychomotor Behavior:  Restless  Affect:  Appropriate  Speech:  Normal  Thought Progress:  Unable to demonstrate  Thought Content:  Mood congurent  Suicidal:  None  Homicidal:  None  Hallucinations:  None  Delusion:  None  Memory:  Unable to evaluate  Orientation:  Person  Reliability:  poor  Insight:  Poor  Judgement:  Poor      Physical Exam:      General Appearance:    Alert, cooperative, in no acute distress   Head:    Normocephalic, without obvious abnormality, atraumatic   Eyes:            Lids and lashes normal, conjunctivae and sclerae normal, no   icterus, no pallor, corneas clear, PERRLA   Ears:    Ears appear intact with no abnormalities noted   Throat:   No oral lesions, no thrush, oral mucosa moist   Neck:   No adenopathy, supple, trachea midline, no thyromegaly, no     carotid bruit, no JVD   Back:     No kyphosis present, no scoliosis present, no skin lesions,       erythema or scars, no tenderness to percussion or                   palpation,   range of motion normal   Lungs:     Clear to auscultation,respirations regular, even and                   unlabored    Heart:    Regular rhythm and normal rate, normal S1 and S2, no            murmur, no gallop, no rub, no click   Breast Exam:    Deferred   Abdomen:     Normal bowel sounds, no masses, no  organomegaly, soft        non-tender, non-distended, no guarding, no rebound                 tenderness   Genitalia:    Deferred   Extremities:   Moves all extremities well, no edema, no cyanosis, no              redness   Pulses:   Pulses palpable and equal bilaterally   Skin:   No bleeding, bruising or rash   Lymph nodes:   No palpable adenopathy   Neurologic:   Cranial nerves 2 - 12 grossly intact, sensation intact, DTR        present and equal bilaterally       Objective     Vital Signs    Temp:  [97.2 °F (36.2 °C)-98.6 °F (37 °C)] 98.6 °F (37 °C)  Heart Rate:  [71-88] 71  Resp:  [18-22] 18  BP: (110-148)/(69-96) 124/77    Lab Results:   Lab Results (last 24 hours)     Procedure Component Value Units Date/Time    Comprehensive Metabolic Panel [[id number]]  (Abnormal) Collected:  10/02/18 0507    Specimen:  Blood Updated:  10/02/18 0651     Glucose 87 mg/dL      BUN 10 mg/dL      Creatinine 0.65 mg/dL      Sodium 140 mmol/L      Potassium 3.6 mmol/L      Chloride 109 mmol/L      CO2 25.1 mmol/L      Calcium 8.6 mg/dL      Total Protein 6.8 g/dL      Albumin 3.80 g/dL      ALT (SGPT) 24 U/L      AST (SGOT) 30 U/L      Alkaline Phosphatase 106 (H) U/L      Comment: Note New Reference Ranges        Total Bilirubin 0.5 mg/dL      eGFR Non African Amer 94 mL/min/1.73      Globulin 3.0 gm/dL      A/G Ratio 1.3 (L) g/dL      BUN/Creatinine Ratio 15.4     Anion Gap 5.9 mmol/L     Osmolality, Calculated [[id number]]  (Normal) Collected:  10/02/18 0507    Specimen:  Blood Updated:  10/02/18 0651     Osmolality Calc 277.8 mOsm/kg            Labs:     Lab Results (last 24 hours)     Procedure Component Value Units Date/Time    Comprehensive Metabolic Panel [[id number]]  (Abnormal) Collected:  10/02/18 0507    Specimen:  Blood Updated:  10/02/18 0651     Glucose 87 mg/dL      BUN 10 mg/dL      Creatinine 0.65 mg/dL      Sodium 140 mmol/L      Potassium 3.6 mmol/L      Chloride 109 mmol/L      CO2 25.1 mmol/L      Calcium 8.6  mg/dL      Total Protein 6.8 g/dL      Albumin 3.80 g/dL      ALT (SGPT) 24 U/L      AST (SGOT) 30 U/L      Alkaline Phosphatase 106 (H) U/L      Comment: Note New Reference Ranges        Total Bilirubin 0.5 mg/dL      eGFR Non African Amer 94 mL/min/1.73      Globulin 3.0 gm/dL      A/G Ratio 1.3 (L) g/dL      BUN/Creatinine Ratio 15.4     Anion Gap 5.9 mmol/L     Osmolality, Calculated [[id number]]  (Normal) Collected:  10/02/18 0507    Specimen:  Blood Updated:  10/02/18 0651     Osmolality Calc 277.8 mOsm/kg                           Lab Results   Component Value Date    WBC 5.70 10/01/2018    HGB 13.8 10/01/2018    HCT 41.2 10/01/2018    MCV 84.8 10/01/2018     10/01/2018     Lab Results   Component Value Date    GLUCOSE 87 10/02/2018    BUN 10 10/02/2018    CREATININE 0.65 10/02/2018    EGFRIFNONA 94 10/02/2018    BCR 15.4 10/02/2018    CO2 25.1 10/02/2018    CALCIUM 8.6 10/02/2018    ALBUMIN 3.80 10/02/2018    AST 30 10/02/2018    ALT 24 10/02/2018     Pain Management Panel     Pain Management Panel Latest Ref Rng & Units 10/1/2018 9/22/2018    AMPHETAMINES SCREEN, URINE Negative Negative Negative    BARBITURATES SCREEN Negative Negative Negative    BENZODIAZEPINE SCREEN, URINE Negative Negative Negative    BUPRENORPHINE Negative Negative Negative    COCAINE SCREEN, URINE Negative Negative Negative    METHADONE SCREEN, URINE Negative Negative Negative          Assessment/Diagnosis: Psychosi NOS provisional    Dementia per history  Plan of Care: She is admitted, placed on special precautions level III.  She has been placed on following home medications after they were verified by the pharmacy vitamin D3 2000 units daily, Aricept 10 mg daily, Lexapro 10 mg daily.  I will do daily psychiatric evaluation for assessment of mental status and treat as needed, will obtain more collateral information.  Patient has been placed on when necessary Ativan, Haldol and Benadryl for agitation from the emergency  room.  We'll try to obtain records of recent hospitalization at [facility].      Results Review: CMP is essentially within normal limits   CBC is essentially within normal limits   Urinalysis session on 2+ leukocyte esterase, 6-12 WBCs. urine drug screen is positive for THC  EKG has shown normal sinus rhythm possible left atrial enlargement  Assessment/Plan     Active Problems:    Major depressive disorder        Treatment Plan discussed with:       I have reviewed and approved the behavioral health treatment plans and problem list. Yes    Philomena Klein MD  10/02/18  12:36 PM

## 2018-10-02 NOTE — NURSING NOTE
Son and POA, Tee Balderas, called. POA verbally agrees for admission, treatment and any medications needed. Verified by RAJANI Rodriguez

## 2018-10-02 NOTE — NURSING NOTE
RAJANI Rodriguez called [facility]'s Dept and spoke with Petra, reported Duty to Warn for son, Tee Balderas. Spoke with Deputy Escudero with [facility] dept, Duty to Warn completed.

## 2018-10-02 NOTE — ED NOTES
Pt medically cleared at this time to be evaluated by Psych per Dr. Burk. Report given to Zeyad Escobar RN at this time.      Joana Georges, RN  10/01/18 [id number]

## 2018-10-02 NOTE — NURSING NOTE
Reviewed patient clinicals and lab work with Dr. Mares. Admission orders read back and verified x 2. ED provider, patient/family and pharmacy aware.
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
## 2018-10-02 NOTE — NURSING NOTE
Attempted to call Tee Balderas, patients son and medical POA. Phone call unanswered, messaged left to call me back.
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
## 2018-10-02 NOTE — PLAN OF CARE
Problem: Cognitive Impairment (Dementia Signs/Symptoms) (Adult)  Goal: Optimized Cognitive Function (Dementia Signs/Symptoms)  Outcome: Ongoing (interventions implemented as appropriate)      Problem: Behavioral Impairment (Dementia Signs/Symptoms) (Adult)  Goal: Improved Behavioral Control (Dementia Signs/Symptoms)  Outcome: Ongoing (interventions implemented as appropriate)      Problem: Psychological Impairment (Dementia Signs/Symptoms) (Adult)  Goal: Improved Psychological Symptoms (Dementia Signs/Symptoms)  Outcome: Ongoing (interventions implemented as appropriate)      Problem: Social/Functional Impairment (Dementia Signs/Symptoms) (Adult)  Goal: Improved Social/Functional Skills/Ability (Dementia Signs/Symptoms)  Outcome: Ongoing (interventions implemented as appropriate)      Problem: Fall Risk (Adult)  Goal: Identify Related Risk Factors and Signs and Symptoms  Outcome: Ongoing (interventions implemented as appropriate)    Goal: Absence of Fall  Outcome: Ongoing (interventions implemented as appropriate)
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
## 2018-10-03 PROCEDURE — 25010000002 LORAZEPAM PER 2 MG: Performed by: PSYCHIATRY & NEUROLOGY

## 2018-10-03 RX ORDER — RISPERIDONE 0.25 MG/1
0.5 TABLET ORAL EVERY 12 HOURS SCHEDULED
Status: DISCONTINUED | OUTPATIENT
Start: 2018-10-03 | End: 2018-10-08 | Stop reason: HOSPADM

## 2018-10-03 RX ADMIN — CHOLECALCIFEROL TAB 10 MCG (400 UNIT) 2000 UNITS: 10 TAB at 09:30

## 2018-10-03 RX ADMIN — RISPERIDONE 0.5 MG: 0.25 TABLET ORAL at 20:39

## 2018-10-03 RX ADMIN — DONEPEZIL HYDROCHLORIDE 10 MG: 5 TABLET, FILM COATED ORAL at 09:30

## 2018-10-03 RX ADMIN — ESCITALOPRAM 10 MG: 10 TABLET, FILM COATED ORAL at 09:30

## 2018-10-03 RX ADMIN — RISPERIDONE 0.5 MG: 0.25 TABLET ORAL at 13:49

## 2018-10-03 RX ADMIN — LORAZEPAM 1 MG: 2 INJECTION INTRAMUSCULAR; INTRAVENOUS at 20:52

## 2018-10-03 NOTE — PROGRESS NOTES
[id number]  Therapist spoke with patient's friend, Giovany, to discuss disposition and obtain collateral information.  Giovany discussed the patient currently has 2 sons and that her 26-year-old son, Tee, has power of  over patient's finances.  Giovany reports that patient's mother is  and has been for over a decade.  Giovany discussed that that she and patient are lifelong friends and also work together as teachers for several years.  Giovany discussed that recently patient was diagnosed with dementia while [facility].  She reports that patient's son, Tee, refused to pick patient up upon discharge and asked Giovany to take patient in.  Reports that Tee refused to take patient back and the patient was admitted to [facility] for 1.5 months.  Giovany discussed the patient has been living with her for the last few weeks.  She reports she is agreeable for patient to return to the home.  Giovany also discussed that patient's son, Tee, has a court hearing in a few weeks for guardianship.  Giovany expressed that she is pursuing guardianship as well over patient has a court date next week.  Giovany reports that patient's son are not interested in assisting patient, however that patient's son seem interested in obtaining patient's money.  Giovany reports she is concerned that patient's son, Tee, has retained patient's income and has ulterior motives were pending guardianship.  Giovany reports that patient's son has recently moved out of [employer] and she is unsure where he is located.     Giovany also discussed the patient has displayed unusual behaviors recently such as refusing to put her clothes on when she got a shower recently, forgetting how to cook, and more easily irritated.  She reports that patient has been diagnosed with early onset dementia while at [facility].  Giovany discussed the patient is more calm and at baseline when she is compliant with medications.  Giovany reports she is agreeable  for patient to live with her upon discharge and the home is safe guarded.

## 2018-10-03 NOTE — PROGRESS NOTES
"   LOS: 1 day   Patient Care Team:  Gabriela Uribe MD as PCP - General (Family Medicine)    Chief Complaint:  Patient complains \"I want to go home\" I asked her where is her home, she says \" [place]\"  She says she lives with her mother in [place].  She is unable to give any other information, cannot give her address or even tell her mother's name  She denies depression or anxiety answers \" no\",.she says she has slept all right .she denies SI , HI , hallucinations or paranoia .she answers \"I don't know\" to questions pertaining to testing of her orientation to time and place .  Interval History: Patient has had one episode requiring when necessary Ativan, Haldol and Benadryl.          Vital Signs    Temp:  [98.1 °F (36.7 °C)-98.9 °F (37.2 °C)] 98.6 °F (37 °C)  Heart Rate:  [79-87] 79  Resp:  [20] 20  BP: (124-171)/(76-91) 124/76    Lab Results:   Lab Results (last 24 hours)     ** No results found for the last 24 hours. **           Labs:     Lab Results (last 24 hours)     ** No results found for the last 24 hours. **                        Exam:    Mental Status Exam:     Hygiene:   fair  Cooperation:  Cooperative  Eye Contact:  Downcast  Psychomotor Behavior:  Slow  Affect:  Restricted  Speech:  Normal  Thought Progress:  Goal directed  Thought Content:  Mood congurent  Suicidal:  None  Homicidal:  None  Hallucinations:  None  Delusion:  None  Memory:  Deficits  Orientation:  Person  Reliability:  fair  Insight:  Poor  Judgement:  Poor  Impulse Control:  Impaired      Results Review:    Lab Results (last 24 hours)     ** No results found for the last 24 hours. **          Medication Review:  Hospital Medications (active)       Dose Frequency Start End    aluminum-magnesium hydroxide-simethicone (MAALOX MAX) 400-400-40 MG/5ML suspension 15 mL 15 mL Every 6 Hours PRN 10/2/2018     Sig - Route: Take 15 mL by mouth Every 6 (Six) Hours As Needed for Indigestion or Heartburn. - Oral    benzonatate (TESSALON) " "capsule 100 mg 100 mg 3 Times Daily PRN 10/2/2018     Sig - Route: Take 1 capsule by mouth 3 (Three) Times a Day As Needed for Cough. - Oral    benztropine (COGENTIN) injection 0.5 mg 0.5 mg Daily PRN 10/2/2018     Sig - Route: Inject 0.5 mL into the appropriate muscle as directed by prescriber Daily As Needed (Drug-induced extrapyramidal symptoms). - Intramuscular    Linked Group 1:  \"Or\" Linked Group Details        benztropine (COGENTIN) tablet 1 mg 1 mg Daily PRN 10/2/2018     Sig - Route: Take 1 tablet by mouth Daily As Needed (Drug-induced extrapyramidal symptoms). - Oral    Linked Group 1:  \"Or\" Linked Group Details        cholecalciferol (VITAMIN D3) tablet 2,000 Units 2,000 Units Daily 10/2/2018     Sig - Route: Take 5 tablets by mouth Daily. - Oral    diphenhydrAMINE (BENADRYL) capsule 25 mg 25 mg Every 4 Hours PRN 10/2/2018     Sig - Route: Take 1 capsule by mouth Every 4 (Four) Hours As Needed (Agitation). - Oral    diphenhydrAMINE (BENADRYL) injection 25 mg 25 mg Every 4 Hours PRN 10/2/2018     Sig - Route: Inject 0.5 mL into the appropriate muscle as directed by prescriber Every 4 (Four) Hours As Needed (Agitation). - Intramuscular    donepezil (ARICEPT) tablet 10 mg 10 mg Daily 10/2/2018     Sig - Route: Take 2 tablets by mouth Daily. - Oral    escitalopram (LEXAPRO) tablet 10 mg 10 mg Daily 10/2/2018     Sig - Route: Take 1 tablet by mouth Daily. - Oral    famotidine (PEPCID) tablet 20 mg 20 mg 2 Times Daily PRN 10/2/2018     Sig - Route: Take 1 tablet by mouth 2 (Two) Times a Day As Needed for Heartburn. - Oral    haloperidol (HALDOL) tablet 2 mg 2 mg Every 4 Hours PRN 10/2/2018     Sig - Route: Take 1 tablet by mouth Every 4 (Four) Hours As Needed for Agitation. - Oral    haloperidol lactate (HALDOL) injection 2 mg 2 mg Every 4 Hours PRN 10/2/2018     Sig - Route: Inject 0.4 mL into the appropriate muscle as directed by prescriber Every 4 (Four) Hours As Needed for Agitation. - Intramuscular    " Notes to Pharmacy: May be given PO or IM    hydrOXYzine (ATARAX) tablet 50 mg 50 mg Every 6 Hours PRN 10/2/2018     Sig - Route: Take 1 tablet by mouth Every 6 (Six) Hours As Needed for Anxiety. - Oral    ibuprofen (ADVIL,MOTRIN) tablet 600 mg 600 mg Every 6 Hours PRN 10/2/2018     Sig - Route: Take 1 tablet by mouth Every 6 (Six) Hours As Needed for Mild Pain  or Moderate Pain  (severe pain (7-10)). - Oral    loperamide (IMODIUM) capsule 2 mg 2 mg 4 Times Daily PRN 10/2/2018     Sig - Route: Take 1 capsule by mouth 4 (Four) Times a Day As Needed for Diarrhea. - Oral    LORazepam (ATIVAN) injection 1 mg 1 mg Every 4 Hours PRN 10/2/2018     Sig - Route: Infuse 0.5 mL into a venous catheter Every 4 (Four) Hours As Needed for Agitation. - Intravenous    Notes to Pharmacy: May be given PO or IM    LORazepam (ATIVAN) tablet 1 mg 1 mg Every 4 Hours PRN 10/2/2018     Sig - Route: Take 1 tablet by mouth Every 4 (Four) Hours As Needed (Agitation). - Oral    Notes to Pharmacy: May be given PO or IM    magnesium hydroxide (MILK OF MAGNESIA) suspension 2400 mg/10mL 10 mL 10 mL Daily PRN 10/2/2018     Sig - Route: Take 10 mL by mouth Daily As Needed for Constipation. - Oral    ondansetron (ZOFRAN) tablet 4 mg 4 mg Every 6 Hours PRN 10/2/2018     Sig - Route: Take 1 tablet by mouth Every 6 (Six) Hours As Needed for Nausea or Vomiting. - Oral    sodium chloride (OCEAN) nasal spray 2 spray 2 spray As Needed 10/2/2018     Sig - Route: 2 sprays by Each Nare route As Needed for Congestion. - Each Nare    traZODone (DESYREL) tablet 50 mg 50 mg Nightly PRN 10/2/2018     Sig - Route: Take 1 tablet by mouth At Night As Needed for Sleep. - Oral             Assessment/Plan     Assessment: Assessment/Diagnosis: Psychosi NOS provisional  Dementia    Treatment Plan: We will do MMSE, will obtain record of recent hospitalization at [facility], consider CT of the head.  And will start her on Risperdal half milligram twice a  day          I have reviewed and approved the behavioral health treatment plans and problem list. Yes        Philomena Klein MD  10/03/18  11:09 AM

## 2018-10-03 NOTE — PLAN OF CARE
Problem: Patient Care Overview  Goal: Plan of Care Review  Outcome: Ongoing (interventions implemented as appropriate)   10/03/18 0051   Coping/Psychosocial   Plan of Care Reviewed With patient   Coping/Psychosocial   Patient Agreement with Plan of Care unable to participate   Plan of Care Review   Progress no change   OTHER   Outcome Summary Patient became agitated at beginning of shift requiring PRN injections. Patient then slept throughout the night.

## 2018-10-03 NOTE — PLAN OF CARE
Problem: Patient Care Overview  Goal: Interprofessional Rounds/Family Conf  Outcome: Ongoing (interventions implemented as appropriate)   10/03/18 0950   Interdisciplinary Rounds/Family Conf   Summary Staffed patient's case with treatment team.   Interdisciplinary Rounds/Family Conf   Participants psychiatrist;social work;nursing   Data:  Therapist met with patient for individual session at bedside to discuss progress treatment and address concerns.  Patient appeared very groggy and confused, disoriented.  She expressed many times that she wanted to go home today with her mother.  Patient reports her mother lives in [place].  Patient unable to tell therapist her mother's name.  Patient discussed that she has been living with a friend named Giovany in [place] recently.  Patient agreeable to return there if needed upon discharge.  Patient agreeable for aftercare to be scheduled with MAEGAN Felder upon discharge.  Patient appeared to rock frequently during session and displayed poor eye contact.  She seemed anxious and seemed to be poor historian.  She denied concerned asked to rest.  Assessment:  Patient appeared to display a restricted affect and anxious mood.  She denied suicidal thoughts and homicidal thoughts.  She denied hallucinations.  Patient did endorse paranoia, stating she feels people are watching her.  She seemed to display poor insight and appeared confused.  Patient oriented to being in the hospital.  She reports poor sleep and poor appetite.  Plan:  Patient consented for aftercare to be scheduled with MAEGAN Felder upon discharge.  Navigator to assist.  Therapist to obtain collateral from patient's friend, Giovany, and obtain collateral from patient's son, Tee.  Disposition to be determined.

## 2018-10-03 NOTE — NURSING NOTE
"Patient agitated pacing up and down hallway, crying \"I want to go home.\" Patient is oriented to self only. Staff attempted multiple times to reorient patient and redirect her. Patient continued to get more agitated and then began screaming and crying, refusing redirection by staff. Patient led to room by staff and given PRN injections as ordered. Will continue to monitor.   "

## 2018-10-04 RX ADMIN — DONEPEZIL HYDROCHLORIDE 10 MG: 5 TABLET, FILM COATED ORAL at 08:49

## 2018-10-04 RX ADMIN — ESCITALOPRAM 10 MG: 10 TABLET, FILM COATED ORAL at 08:49

## 2018-10-04 RX ADMIN — RISPERIDONE 0.5 MG: 0.25 TABLET ORAL at 08:49

## 2018-10-04 RX ADMIN — CHOLECALCIFEROL TAB 10 MCG (400 UNIT) 2000 UNITS: 10 TAB at 08:49

## 2018-10-04 NOTE — PLAN OF CARE
Problem: Patient Care Overview  Goal: Plan of Care Review  Outcome: Ongoing (interventions implemented as appropriate)  Pt. Alert oriented  to self she verbalizes she is in [place] she is calm through day no interaction noted with peers she was incontinent of urine & stool in her room in floor and she verbalizes she is not the one who did that .   10/03/18 2043   Coping/Psychosocial   Plan of Care Reviewed With patient   Coping/Psychosocial   Patient Agreement with Plan of Care agrees   Plan of Care Review   Progress no change       Problem: Overarching Goals (Adult)  Goal: Adheres to Safety Considerations for Self and Others  Outcome: Ongoing (interventions implemented as appropriate)    Goal: Optimized Coping Skills in Response to Life Stressors  Outcome: Ongoing (interventions implemented as appropriate)    Goal: Develops/Participates in Therapeutic [place] to Support Successful Transition  Outcome: Ongoing (interventions implemented as appropriate)

## 2018-10-04 NOTE — PROGRESS NOTES
"   LOS: 2 days   Patient Care Team:  Gabriela Uribe MD as PCP - General (Family Medicine)    Chief Complaint:  She has no complaints, she says \"I am all right\", she denies depression and anxiety with a \"no\" answer.  She says she has slept good.  She also says she is eating good.  She denies SI or HI or hallucinations, denies paranoia.  She is oriented to person but does not answer any questions pertaining to checking orientation to place and time, she keeps saying she is at [facility] History: Patient has received 1 dose of Ativan 1 mg by mouth when necessary yesterday          Vital Signs    Temp:  [98.3 °F (36.8 °C)] 98.3 °F (36.8 °C)  Heart Rate:  [85] 85  Resp:  [20] 20  BP: (155)/(92) 155/92    Lab Results:   Lab Results (last 24 hours)     ** No results found for the last 24 hours. **           Labs:     Lab Results (last 24 hours)     ** No results found for the last 24 hours. **                        Exam:    Mental Status Exam:     Hygiene:   fair  Cooperation:  Cooperative  Eye Contact:  Fair  Psychomotor Behavior:  Slow  Affect:  Restricted  Speech:  Normal  Thought Progress:  Goal directed  Thought Content:  Mood congurent  Suicidal:  None  Homicidal:  None  Hallucinations:  None  Delusion:  None  Memory:  Unable to evaluate  Orientation:  Person  Reliability:  fair  Insight:  Poor  Judgement:  Fair  Impulse Control:  Fair      Results Review:    Lab Results (last 24 hours)     ** No results found for the last 24 hours. **          Medication Review:  Hospital Medications (active)       Dose Frequency Start End    aluminum-magnesium hydroxide-simethicone (MAALOX MAX) 400-400-40 MG/5ML suspension 15 mL 15 mL Every 6 Hours PRN 10/2/2018     Sig - Route: Take 15 mL by mouth Every 6 (Six) Hours As Needed for Indigestion or Heartburn. - Oral    benzonatate (TESSALON) capsule 100 mg 100 mg 3 Times Daily PRN 10/2/2018     Sig - Route: Take 1 capsule by mouth 3 (Three) Times a Day As Needed " "for Cough. - Oral    benztropine (COGENTIN) injection 0.5 mg 0.5 mg Daily PRN 10/2/2018     Sig - Route: Inject 0.5 mL into the appropriate muscle as directed by prescriber Daily As Needed (Drug-induced extrapyramidal symptoms). - Intramuscular    Linked Group 1:  \"Or\" Linked Group Details        benztropine (COGENTIN) tablet 1 mg 1 mg Daily PRN 10/2/2018     Sig - Route: Take 1 tablet by mouth Daily As Needed (Drug-induced extrapyramidal symptoms). - Oral    Linked Group 1:  \"Or\" Linked Group Details        cholecalciferol (VITAMIN D3) tablet 2,000 Units 2,000 Units Daily 10/2/2018     Sig - Route: Take 5 tablets by mouth Daily. - Oral    diphenhydrAMINE (BENADRYL) capsule 25 mg 25 mg Every 4 Hours PRN 10/2/2018     Sig - Route: Take 1 capsule by mouth Every 4 (Four) Hours As Needed (Agitation). - Oral    diphenhydrAMINE (BENADRYL) injection 25 mg 25 mg Every 4 Hours PRN 10/2/2018     Sig - Route: Inject 0.5 mL into the appropriate muscle as directed by prescriber Every 4 (Four) Hours As Needed (Agitation). - Intramuscular    donepezil (ARICEPT) tablet 10 mg 10 mg Daily 10/2/2018     Sig - Route: Take 2 tablets by mouth Daily. - Oral    escitalopram (LEXAPRO) tablet 10 mg 10 mg Daily 10/2/2018     Sig - Route: Take 1 tablet by mouth Daily. - Oral    famotidine (PEPCID) tablet 20 mg 20 mg 2 Times Daily PRN 10/2/2018     Sig - Route: Take 1 tablet by mouth 2 (Two) Times a Day As Needed for Heartburn. - Oral    haloperidol (HALDOL) tablet 2 mg 2 mg Every 4 Hours PRN 10/2/2018     Sig - Route: Take 1 tablet by mouth Every 4 (Four) Hours As Needed for Agitation. - Oral    haloperidol lactate (HALDOL) injection 2 mg 2 mg Every 4 Hours PRN 10/2/2018     Sig - Route: Inject 0.4 mL into the appropriate muscle as directed by prescriber Every 4 (Four) Hours As Needed for Agitation. - Intramuscular    Notes to Pharmacy: May be given PO or IM    hydrOXYzine (ATARAX) tablet 50 mg 50 mg Every 6 Hours PRN 10/2/2018     Sig - " Route: Take 1 tablet by mouth Every 6 (Six) Hours As Needed for Anxiety. - Oral    ibuprofen (ADVIL,MOTRIN) tablet 600 mg 600 mg Every 6 Hours PRN 10/2/2018     Sig - Route: Take 1 tablet by mouth Every 6 (Six) Hours As Needed for Mild Pain  or Moderate Pain  (severe pain (7-10)). - Oral    loperamide (IMODIUM) capsule 2 mg 2 mg 4 Times Daily PRN 10/2/2018     Sig - Route: Take 1 capsule by mouth 4 (Four) Times a Day As Needed for Diarrhea. - Oral    LORazepam (ATIVAN) injection 1 mg 1 mg Every 4 Hours PRN 10/2/2018     Sig - Route: Infuse 0.5 mL into a venous catheter Every 4 (Four) Hours As Needed for Agitation. - Intravenous    Notes to Pharmacy: May be given PO or IM    LORazepam (ATIVAN) tablet 1 mg 1 mg Every 4 Hours PRN 10/2/2018     Sig - Route: Take 1 tablet by mouth Every 4 (Four) Hours As Needed (Agitation). - Oral    Notes to Pharmacy: May be given PO or IM    magnesium hydroxide (MILK OF MAGNESIA) suspension 2400 mg/10mL 10 mL 10 mL Daily PRN 10/2/2018     Sig - Route: Take 10 mL by mouth Daily As Needed for Constipation. - Oral    ondansetron (ZOFRAN) tablet 4 mg 4 mg Every 6 Hours PRN 10/2/2018     Sig - Route: Take 1 tablet by mouth Every 6 (Six) Hours As Needed for Nausea or Vomiting. - Oral    risperiDONE (risperDAL) tablet 0.5 mg 0.5 mg Every 12 Hours Scheduled 10/3/2018     Sig - Route: Take 2 tablets by mouth Every 12 (Twelve) Hours. - Oral    sodium chloride (OCEAN) nasal spray 2 spray 2 spray As Needed 10/2/2018     Sig - Route: 2 sprays by Each Nare route As Needed for Congestion. - Each Nare    traZODone (DESYREL) tablet 50 mg 50 mg Nightly PRN 10/2/2018     Sig - Route: Take 1 tablet by mouth At Night As Needed for Sleep. - Oral               Assessment/Plan     Assessment: Assessment: Assessment/Diagnosis: Psychosi NOS provisional  Dementia      Treatment Plan: No changes, patient's MMSE score today is 14      Treatment Plan discussed with: Nurse, she will follow-up in obtaining records  from [facility].  I will see if any further neurological workup is  needed.  Will have therapist to confirm discharge disposition.  I have reviewed and approved the behavioral health treatment plans and problem list. Yes        Philomena Klein MD  10/04/18  10:09 AM

## 2018-10-04 NOTE — PLAN OF CARE
Problem: Patient Care Overview  Goal: Interprofessional Rounds/Family Conf  Outcome: Ongoing (interventions implemented as appropriate)   10/04/18 8008   Interdisciplinary Rounds/Family Conf   Summary Staffed patient's case with Dr. Klein.   Interdisciplinary Rounds/Family Conf   Participants psychiatrist;social work   Therapist met with patient to discuss progress with treatment and address concerns. Patient endorsed feeling depressed and anxious.  She frequently asked to go home today.  Patient appeared confused and delusional, stating that she lives with her mother in [place].  Per patient's son, her mother is  and patient does not live in [place].  Patient appeared tearful at times with restricted affect.  She denied SI, HI, and AVH.  Patient oriented to being in hospital.   She is agreeable to return home with friend, Giovany, and have follow up with MAEGAN Felder upon discharge.
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
## 2018-10-04 NOTE — NURSING NOTE
Pt crying loudly in dayroom, attempting to hug peers. Pt difficult to redirect becoming agitated with redirection. Pt led to room by staff and PRN injection given as ordered.

## 2018-10-04 NOTE — NURSING NOTE
"Pt standing by nurse station crying, states \"I want to go home\". Pt redirected to dayroom. Pt continues to cry sitting in dayroom.   "

## 2018-10-04 NOTE — PLAN OF CARE
"Problem: Patient Care Overview  Goal: Plan of Care Review  Outcome: Ongoing (interventions implemented as appropriate)   10/04/18 0235   Coping/Psychosocial   Plan of Care Reviewed With patient   Coping/Psychosocial   Patient Agreement with Plan of Care agrees   OTHER   Outcome Summary Pt tearful, crying in dayroom attempting to hug peers. Upon redirection pt became agitated demanding to go home. PRN injection given. Pt verbalizes anxiety but unable to rate. Denies SI HI hallucinations. Other questions pt states \"I don't know\"       Problem: Overarching Goals (Adult)  Goal: Adheres to Safety Considerations for Self and Others  Outcome: Ongoing (interventions implemented as appropriate)    Goal: Optimized Coping Skills in Response to Life Stressors  Outcome: Ongoing (interventions implemented as appropriate)    Goal: Develops/Participates in Therapeutic [place] to Support Successful Transition  Outcome: Ongoing (interventions implemented as appropriate)        "

## 2018-10-04 NOTE — PLAN OF CARE
Problem: Patient Care Overview  Goal: Plan of Care Review  Outcome: Ongoing (interventions implemented as appropriate)  Pt. Oriented to name says she is in [place] she verbalizes wants to go home she verbalizes lives with mom doesn't know  mother name she verbalizes feels sad yes I'm sad denies a/v hallucinations deneis any thoughts to hurt self the patient incontinent stool /urine today needs assistance with bathing the patient. In dayroom watching t.v. Interaction noted with peers    10/04/18 2126   Coping/Psychosocial   Patient Agreement with Plan of Care agrees   Plan of Care Review   Progress no change       Problem: Overarching Goals (Adult)  Goal: Adheres to Safety Considerations for Self and Others  Outcome: Ongoing (interventions implemented as appropriate)    Goal: Optimized Coping Skills in Response to Life Stressors  Outcome: Ongoing (interventions implemented as appropriate)    Goal: Develops/Participates in Therapeutic [place] to Support Successful Transition  Outcome: Ongoing (interventions implemented as appropriate)

## 2018-10-05 ENCOUNTER — APPOINTMENT (OUTPATIENT)
Dept: CT IMAGING | Facility: HOSPITAL | Age: 58
End: 2018-10-05

## 2018-10-05 PROCEDURE — 70450 CT HEAD/BRAIN W/O DYE: CPT | Performed by: RADIOLOGY

## 2018-10-05 PROCEDURE — 70450 CT HEAD/BRAIN W/O DYE: CPT

## 2018-10-05 RX ADMIN — DONEPEZIL HYDROCHLORIDE 10 MG: 5 TABLET, FILM COATED ORAL at 08:55

## 2018-10-05 RX ADMIN — ESCITALOPRAM 10 MG: 10 TABLET, FILM COATED ORAL at 08:55

## 2018-10-05 RX ADMIN — TRAZODONE HYDROCHLORIDE 50 MG: 50 TABLET ORAL at 23:10

## 2018-10-05 RX ADMIN — RISPERIDONE 0.5 MG: 0.25 TABLET ORAL at 08:55

## 2018-10-05 RX ADMIN — CHOLECALCIFEROL TAB 10 MCG (400 UNIT) 2000 UNITS: 10 TAB at 08:56

## 2018-10-05 RX ADMIN — RISPERIDONE 0.5 MG: 0.25 TABLET ORAL at 20:57

## 2018-10-05 RX ADMIN — HYDROXYZINE HYDROCHLORIDE 50 MG: 50 TABLET, FILM COATED ORAL at 23:10

## 2018-10-05 NOTE — PLAN OF CARE
"Problem: Patient Care Overview  Goal: Plan of Care Review  Outcome: Ongoing (interventions implemented as appropriate)  Pt. unable to rate anxiety or depression, but agreed she had both. Stated: \"I\"m all right.\" Pt. Refused to answer assessment questions and appears to be oriented to person only. At h.s. Patient refused her bedtime meds despite repeated attempts by myself and med nurse. Asked me what they were and I told her, she replied she didn't want to take them because she wanted to go home. I told her she would be able to go home quicker if she took meds and she replied: \"I don't care.\" Sat in day room with peers, but had little to say. Initially refusing to go to bed, but around 2330 she did agree to go and was able to go to sleep.   10/05/18 0330   Coping/Psychosocial   Plan of Care Reviewed With patient   Coping/Psychosocial   Patient Agreement with Plan of Care agrees   Plan of Care Review   Progress no change       Problem: Overarching Goals (Adult)  Goal: Adheres to Safety Considerations for Self and Others  Outcome: Ongoing (interventions implemented as appropriate)    Goal: Optimized Coping Skills in Response to Life Stressors  Outcome: Ongoing (interventions implemented as appropriate)    Goal: Develops/Participates in Therapeutic [place] to Support Successful Transition  Outcome: Ongoing (interventions implemented as appropriate)        "

## 2018-10-05 NOTE — PROGRESS NOTES
Therapist spoke with patient's friend, Giovany, via phone. Therapist discussed that patient is improving and could possibly discharge today.  Therapist reviewed that patient did however refuse her night medication last night.  Giovany discussed that she is having a large family birthday for her niece this weekend and would prefer patient to continue hospitalization if possible.  Giovany reported she is agreeable for patient to return to live with her, however worries about patient being in large crowds of people this weekend if discharged today.  Therapist reviewed patient's aftercare appointment with MAEGAN Felder on 10/09/18.  Giovany assures that home is safeguarded.

## 2018-10-05 NOTE — PLAN OF CARE
Problem: Patient Care Overview  Goal: Plan of Care Review  Outcome: Ongoing (interventions implemented as appropriate)  PATIENT VERBALIZES POOR APPETITE THIS SHIFT. PATIENT DENIES ANY OTHER PROBLEMS BUT APPEARS DEPRESSED. PATIENT IS PREOCCUPIED WITH DISCHARGE HOME. PATIENT MAY D/C Monday THE 8TH. NEW ORDERS RISPERDAL 1MG   10/05/18 1401   Coping/Psychosocial   Plan of Care Reviewed With patient   Coping/Psychosocial   Patient Agreement with Plan of Care agrees   Plan of Care Review   Progress no change       Problem: Overarching Goals (Adult)  Goal: Adheres to Safety Considerations for Self and Others  Outcome: Ongoing (interventions implemented as appropriate)    Goal: Optimized Coping Skills in Response to Life Stressors  Outcome: Ongoing (interventions implemented as appropriate)    Goal: Develops/Participates in Therapeutic [place] to Support Successful Transition  Outcome: Ongoing (interventions implemented as appropriate)      Problem: Fall Risk (Adult)  Goal: Identify Related Risk Factors and Signs and Symptoms  Outcome: Ongoing (interventions implemented as appropriate)    Goal: Absence of Fall  Outcome: Ongoing (interventions implemented as appropriate)

## 2018-10-05 NOTE — PROGRESS NOTES
"   LOS: 3 days   Patient Care Team:  Gabriela Uribe MD as PCP - General (Family Medicine)    Chief Complaint:  Patient complains and remains repetitive, keeps saying \"I want to go home\", he says she wants to go home with mother, says she hasn't seen her mom in a long time.  I asked her if her mother is living, she says \"yes she is\" she says her home is in [place] but other than this does not give any other information about home.  His depression or anxiety, answers \" no\" but is unable to rate.she denies SI , HI , hallucinations or paranoia .and remains disoriented to person and place .    Interval History: She was reported to be incontinent of stool and urine yesterday, but not since then.  She has taken her medications this a.m. she has been eating well  CT of the head has been done this morning, report is awaited .   discharge summary from [facility]  shows that her Aricept was discontinued due to GI problems .patient has tolerated Aricept fairly well except for one incident of incontinence .she had dementia workup there , it did not shows CT of the head was done , so I have ordered it today       Vital Signs    Temp:  [98.3 °F (36.8 °C)] 98.3 °F (36.8 °C)  Heart Rate:  [74-90] 74  Resp:  [16-20] 16  BP: (106-161)/() 106/65    Lab Results:   Lab Results (last 24 hours)     ** No results found for the last 24 hours. **           Labs:     Lab Results (last 24 hours)     ** No results found for the last 24 hours. **                        Exam:  Mental Status Exam:     Hygiene:   fair  Cooperation:  Cooperative  Eye Contact:  Poor  Psychomotor Behavior:  Restless  Affect:  Appropriate  Speech:  Normal  Thought Progress:  Tangential and Unable to demonstrate  Thought Content:  Mood congurent  Suicidal:  None  Homicidal:  None  Hallucinations:  None  Delusion:  None  Memory:  Deficits  Orientation:  Person  Reliability:  fair  Insight:  Poor  Judgement:  Impaired  Impulse Control:  " "Fair      Results Review:    Lab Results (last 24 hours)     ** No results found for the last 24 hours. **          Medication Review:  Hospital Medications (active)       Dose Frequency Start End    aluminum-magnesium hydroxide-simethicone (MAALOX MAX) 400-400-40 MG/5ML suspension 15 mL 15 mL Every 6 Hours PRN 10/2/2018     Sig - Route: Take 15 mL by mouth Every 6 (Six) Hours As Needed for Indigestion or Heartburn. - Oral    benzonatate (TESSALON) capsule 100 mg 100 mg 3 Times Daily PRN 10/2/2018     Sig - Route: Take 1 capsule by mouth 3 (Three) Times a Day As Needed for Cough. - Oral    benztropine (COGENTIN) injection 0.5 mg 0.5 mg Daily PRN 10/2/2018     Sig - Route: Inject 0.5 mL into the appropriate muscle as directed by prescriber Daily As Needed (Drug-induced extrapyramidal symptoms). - Intramuscular    Linked Group 1:  \"Or\" Linked Group Details        benztropine (COGENTIN) tablet 1 mg 1 mg Daily PRN 10/2/2018     Sig - Route: Take 1 tablet by mouth Daily As Needed (Drug-induced extrapyramidal symptoms). - Oral    Linked Group 1:  \"Or\" Linked Group Details        cholecalciferol (VITAMIN D3) tablet 2,000 Units 2,000 Units Daily 10/2/2018     Sig - Route: Take 5 tablets by mouth Daily. - Oral    diphenhydrAMINE (BENADRYL) capsule 25 mg 25 mg Every 4 Hours PRN 10/2/2018     Sig - Route: Take 1 capsule by mouth Every 4 (Four) Hours As Needed (Agitation). - Oral    donepezil (ARICEPT) tablet 10 mg 10 mg Daily 10/2/2018     Sig - Route: Take 2 tablets by mouth Daily. - Oral    escitalopram (LEXAPRO) tablet 10 mg 10 mg Daily 10/2/2018     Sig - Route: Take 1 tablet by mouth Daily. - Oral    famotidine (PEPCID) tablet 20 mg 20 mg 2 Times Daily PRN 10/2/2018     Sig - Route: Take 1 tablet by mouth 2 (Two) Times a Day As Needed for Heartburn. - Oral    haloperidol (HALDOL) tablet 2 mg 2 mg Every 4 Hours PRN 10/2/2018     Sig - Route: Take 1 tablet by mouth Every 4 (Four) Hours As Needed for Agitation. - Oral    " hydrOXYzine (ATARAX) tablet 50 mg 50 mg Every 6 Hours PRN 10/2/2018     Sig - Route: Take 1 tablet by mouth Every 6 (Six) Hours As Needed for Anxiety. - Oral    ibuprofen (ADVIL,MOTRIN) tablet 600 mg 600 mg Every 6 Hours PRN 10/2/2018     Sig - Route: Take 1 tablet by mouth Every 6 (Six) Hours As Needed for Mild Pain  or Moderate Pain  (severe pain (7-10)). - Oral    loperamide (IMODIUM) capsule 2 mg 2 mg 4 Times Daily PRN 10/2/2018     Sig - Route: Take 1 capsule by mouth 4 (Four) Times a Day As Needed for Diarrhea. - Oral    LORazepam (ATIVAN) injection 1 mg 1 mg Every 4 Hours PRN 10/2/2018     Sig - Route: Infuse 0.5 mL into a venous catheter Every 4 (Four) Hours As Needed for Agitation. - Intravenous    Notes to Pharmacy: May be given PO or IM    LORazepam (ATIVAN) tablet 1 mg 1 mg Every 4 Hours PRN 10/2/2018     Sig - Route: Take 1 tablet by mouth Every 4 (Four) Hours As Needed (Agitation). - Oral    Notes to Pharmacy: May be given PO or IM    magnesium hydroxide (MILK OF MAGNESIA) suspension 2400 mg/10mL 10 mL 10 mL Daily PRN 10/2/2018     Sig - Route: Take 10 mL by mouth Daily As Needed for Constipation. - Oral    ondansetron (ZOFRAN) tablet 4 mg 4 mg Every 6 Hours PRN 10/2/2018     Sig - Route: Take 1 tablet by mouth Every 6 (Six) Hours As Needed for Nausea or Vomiting. - Oral    risperiDONE (risperDAL) tablet 0.5 mg 0.5 mg Every 12 Hours Scheduled 10/3/2018     Sig - Route: Take 2 tablets by mouth Every 12 (Twelve) Hours. - Oral    sodium chloride (OCEAN) nasal spray 2 spray 2 spray As Needed 10/2/2018     Sig - Route: 2 sprays by Each Nare route As Needed for Congestion. - Each Nare    traZODone (DESYREL) tablet 50 mg 50 mg Nightly PRN 10/2/2018     Sig - Route: Take 1 tablet by mouth At Night As Needed for Sleep. - Oral    diphenhydrAMINE (BENADRYL) injection 25 mg (Discontinued) 25 mg Every 4 Hours PRN 10/2/2018 10/4/2018    Sig - Route: Inject 0.5 mL into the appropriate muscle as directed by prescriber  Every 4 (Four) Hours As Needed (Agitation). - Intramuscular    haloperidol lactate (HALDOL) injection 2 mg (Discontinued) 2 mg Every 4 Hours PRN 10/2/2018 10/4/2018    Sig - Route: Inject 0.4 mL into the appropriate muscle as directed by prescriber Every 4 (Four) Hours As Needed for Agitation. - Intramuscular    Notes to Pharmacy: May be given PO or IM             Assessment/Plan     Assessment: Assessment: Assessment: Assessment/Diagnosis: Psychosi NOS provisional  Neurocognitive disorder         Treatment Plan I will try Risperdal 1 mg twice a day .,  We will hold Aricept if patient has GI problems       Treatment Plan discussed with: nurse Montaño    I have reviewed and approved the behavioral health treatment plans and problem list. Yes        Philomena Klein MD  10/05/18  9:49 AM

## 2018-10-05 NOTE — NURSING NOTE
1110 PATIENT OFF FLOOR FOR CT OF HEAD. 1124 PATIENT RETURNED FROM CT. PATIENT APPEARS STABLE AND C/O NO NEEDS AT THIS TIME.

## 2018-10-06 PROCEDURE — 99232 SBSQ HOSP IP/OBS MODERATE 35: CPT | Performed by: PSYCHIATRY & NEUROLOGY

## 2018-10-06 RX ADMIN — HYDROXYZINE HYDROCHLORIDE 50 MG: 50 TABLET, FILM COATED ORAL at 20:31

## 2018-10-06 RX ADMIN — ESCITALOPRAM 10 MG: 10 TABLET, FILM COATED ORAL at 08:19

## 2018-10-06 RX ADMIN — CHOLECALCIFEROL TAB 10 MCG (400 UNIT) 2000 UNITS: 10 TAB at 08:19

## 2018-10-06 RX ADMIN — RISPERIDONE 0.5 MG: 0.25 TABLET ORAL at 08:19

## 2018-10-06 RX ADMIN — TRAZODONE HYDROCHLORIDE 50 MG: 50 TABLET ORAL at 20:31

## 2018-10-06 RX ADMIN — DONEPEZIL HYDROCHLORIDE 10 MG: 5 TABLET, FILM COATED ORAL at 08:19

## 2018-10-06 RX ADMIN — RISPERIDONE 0.5 MG: 0.25 TABLET ORAL at 20:31

## 2018-10-06 NOTE — PLAN OF CARE
Problem: Patient Care Overview  Goal: Plan of Care Review  Outcome: Ongoing (interventions implemented as appropriate)  NO NEW ORDERS NOTED   10/06/18 9782   Coping/Psychosocial   Plan of Care Reviewed With patient   Coping/Psychosocial   Patient Agreement with Plan of Care agrees   Plan of Care Review   Progress no change       Problem: Overarching Goals (Adult)  Goal: Adheres to Safety Considerations for Self and Others  Outcome: Ongoing (interventions implemented as appropriate)    Goal: Optimized Coping Skills in Response to Life Stressors  Outcome: Ongoing (interventions implemented as appropriate)    Goal: Develops/Participates in Therapeutic [place] to Support Successful Transition  Outcome: Ongoing (interventions implemented as appropriate)      Problem: Fall Risk (Adult)  Goal: Identify Related Risk Factors and Signs and Symptoms  Outcome: Ongoing (interventions implemented as appropriate)    Goal: Absence of Fall  Outcome: Ongoing (interventions implemented as appropriate)

## 2018-10-06 NOTE — PLAN OF CARE
Problem: Patient Care Overview  Goal: Plan of Care Review  Outcome: Ongoing (interventions implemented as appropriate)  Pt. has seemed more relaxed today, smiling, talking some to staff. Stlll remains oriented to person only. Has gone to wrong room x3 this shift and has needed to be redirected several times. Denied anxiety and depression. Some difficulty going to sleep and came out of her room 7-8 times before she was able to lie down and go to sleep.   10/06/18 0543   Coping/Psychosocial   Plan of Care Reviewed With patient   Coping/Psychosocial   Patient Agreement with Plan of Care agrees   Plan of Care Review   Progress improving        Problem: Overarching Goals (Adult)  Goal: Adheres to Safety Considerations for Self and Others  Outcome: Ongoing (interventions implemented as appropriate)    Goal: Optimized Coping Skills in Response to Life Stressors  Outcome: Ongoing (interventions implemented as appropriate)    Goal: Develops/Participates in Therapeutic [place] to Support Successful Transition  Outcome: Ongoing (interventions implemented as appropriate)

## 2018-10-06 NOTE — PROGRESS NOTES
"      Inpatient Psy Progress Note   Clinician: Jose Enrique Adams MD  Admission Date: 10/2/2018  11:07 AM 10/06/18    Behavioral Health Treatment Plan and Problem List: I have reviewed and approved the Behavioral Health Treatment Plan and Problem list.    Allergies  Allergies   Allergen Reactions   • Sulfa Antibiotics        Hospital Day: 4 days      Assessment completed within view of staff    History  CC: inpatient followup  Interval HPI: Patient seen and evaluated by me.  Chart reviewed.   Patient denies SI.  She reports that she is tolerating her medication okay - denies side effects.  Patient remains disoriented to date and place.          Interval Review of Systems:   General ROS: negative for - fever or malaise  Endocrine ROS: negative for - palpitations  Respiratory ROS: no cough, shortness of breath, or wheezing  Cardiovascular ROS: no chest pain or dyspnea on exertion  Gastrointestinal ROS: no abdominal pain,no black or bloody stools    /62 (BP Location: Left arm, Patient Position: Lying)   Pulse 71   Temp 98.7 °F (37.1 °C) (Temporal Artery )   Resp 18   Ht 167.6 cm (66\")   Wt 54.4 kg (120 lb)   SpO2 97%   BMI 19.37 kg/m²     Mental Status Exam  Mood: anxious  Affect: dysphoric   Thought Processes: incoherent  Thought Content: negativistic  Hallucinations: no  Suicidal Thoughts: denies  Suicidal Plan/Intent:denies  Hopelesness:Moderate  Homicidal Thoughts:  absent      Medical Decision Making:   Labs:     Lab Results (last 24 hours)     ** No results found for the last 24 hours. **            Radiology:     Imaging Results (last 24 hours)     Procedure Component Value Units Date/Time    CT Head Without Contrast [[id number]] Collected:  10/05/18 1242     Updated:  10/05/18 1244    Narrative:       CT HEAD WO CONTRAST-     CLINICAL INDICATION: dementia work-up          COMPARISON: None available      TECHNIQUE: Axial images of the brain were obtained with out intravenous  contrast.  Reformatted " images were created in the sagittal and coronal  planes.     DOSE: 1121.7 mGy.cm     Radiation dose reduction techniques were utilized per ALARA protocol.  Automated exposure control was initiated through either or [employer] or  [employer] software packages by  protocol.           FINDINGS:   Today's study shows no mass, hemorrhage, or midline shift.   The ventricles, cisterns, and sulci are unremarkable. There is no  hydrocephalus.   There is no evidence of acute ischemia.  I do not see epidural or subdural hematoma.  The gray-white differentiation is appropriate.   The bone window setting images show no destructive calvarial lesion or  acute calvarial fracture.   The posterior fossa is unremarkable.             Impression:       No acute intracranial pathology. Nothing is seen on this exam to  specifically account for the patient's symptoms.     This report was finalized on 10/5/2018 12:42 PM by Dr. Alen Biggs MD.               EKG:     ECG/EMG Results (most recent)     Procedure Component Value Units Date/Time    ECG 12 Lead [[id number]] Collected:  10/02/18 0906     Updated:  10/02/18 2135    Narrative:       Test Reason : Potential adverse reaction to medications.  Blood Pressure : **/** mmHG  Vent. Rate : 072 BPM     Atrial Rate : 072 BPM     P-R Int : 164 ms          QRS Dur : 088 ms      QT Int : 410 ms       P-R-T Axes : 056 028 020 degrees     QTc Int : 448 ms    Normal sinus rhythm  Possible Left atrial enlargement  Borderline ECG  No previous ECGs available  Confirmed by Anderson Darden ([id number]) on 10/2/2018 9:35:17 PM    Referred By:  CASTRO           Confirmed By:Anderson Darden           Medications:     cholecalciferol 2,000 Units Oral Daily   donepezil 10 mg Oral Daily   escitalopram 10 mg Oral Daily   risperiDONE 0.5 mg Oral Q12H          All medications reviewed.      Assessment: Assessment: Assessment: Assessment/Diagnosis  Psychosis NOS provisional  Neurocognitive  disorder        Continue plan per Dr. Klein: Risperdal 1 mg twice a day,  We will hold Aricept if patient has GI problems again.    Continue lexapro.       Continue hospitalization for safety and stabilization.  Continue current level of Special Precautions (q15 minute checks).

## 2018-10-07 PROCEDURE — 99232 SBSQ HOSP IP/OBS MODERATE 35: CPT | Performed by: PSYCHIATRY & NEUROLOGY

## 2018-10-07 RX ADMIN — CHOLECALCIFEROL TAB 10 MCG (400 UNIT) 2000 UNITS: 10 TAB at 09:42

## 2018-10-07 RX ADMIN — RISPERIDONE 0.5 MG: 0.25 TABLET ORAL at 09:42

## 2018-10-07 RX ADMIN — DONEPEZIL HYDROCHLORIDE 10 MG: 5 TABLET, FILM COATED ORAL at 09:42

## 2018-10-07 RX ADMIN — HYDROXYZINE HYDROCHLORIDE 50 MG: 50 TABLET, FILM COATED ORAL at 20:22

## 2018-10-07 RX ADMIN — RISPERIDONE 0.5 MG: 0.25 TABLET ORAL at 20:22

## 2018-10-07 RX ADMIN — TRAZODONE HYDROCHLORIDE 50 MG: 50 TABLET ORAL at 20:22

## 2018-10-07 RX ADMIN — LORAZEPAM 1 MG: 1 TABLET ORAL at 23:17

## 2018-10-07 RX ADMIN — ESCITALOPRAM 10 MG: 10 TABLET, FILM COATED ORAL at 09:42

## 2018-10-07 NOTE — PLAN OF CARE
Problem: Patient Care Overview  Goal: Plan of Care Review  Outcome: Ongoing (interventions implemented as appropriate)   10/07/18 0228   Coping/Psychosocial   Plan of Care Reviewed With patient   Coping/Psychosocial   Patient Agreement with Plan of Care agrees   Plan of Care Review   Progress no change   OTHER   Outcome Summary PT CONTINUES TO BE DISORIENTED TO PLACE, TIME, AND SITUATION. PT FREQUENTLY REPEATS QUESTIONS PREVIOUSLY ASKED. PT REQUIRES FREQUENT REDIRECTION        Problem: Overarching Goals (Adult)  Goal: Adheres to Safety Considerations for Self and Others  Outcome: Ongoing (interventions implemented as appropriate)    Goal: Optimized Coping Skills in Response to Life Stressors  Outcome: Ongoing (interventions implemented as appropriate)    Goal: Develops/Participates in Therapeutic [place] to Support Successful Transition  Outcome: Ongoing (interventions implemented as appropriate)

## 2018-10-07 NOTE — PLAN OF CARE
Problem: Patient Care Overview  Goal: Plan of Care Review  Outcome: Ongoing (interventions implemented as appropriate)  Pt. Unable to rate anxiety and depression, but denies. Oriented to person only. Out of room during evening shift. Sat quietly and watched television. Does attempt to answer staff when questions asked, but mostly just says she doesn't know. Has been pleasant, but is disorganiz   10/07/18 0433   Coping/Psychosocial   Plan of Care Reviewed With patient   Coping/Psychosocial   Patient Agreement with Plan of Care agrees   Plan of Care Review   Progress no change   ed and requires lots of redirecting.    Problem: Overarching Goals (Adult)  Goal: Adheres to Safety Considerations for Self and Others  Outcome: Ongoing (interventions implemented as appropriate)    Goal: Optimized Coping Skills in Response to Life Stressors  Outcome: Ongoing (interventions implemented as appropriate)    Goal: Develops/Participates in Therapeutic [place] to Support Successful Transition  Outcome: Ongoing (interventions implemented as appropriate)

## 2018-10-07 NOTE — PROGRESS NOTES
"      Inpatient Psy Progress Note   Clinician: Jose Enrique Adams MD  Admission Date: 10/2/2018  9:53 AM 10/07/18    Behavioral Health Treatment Plan and Problem List: I have reviewed and approved the Behavioral Health Treatment Plan and Problem list.    Allergies  Allergies   Allergen Reactions   • Sulfa Antibiotics        Hospital Day: 5 days      Assessment completed within view of staff    History  CC: inpatient followup  Interval HPI: Patient seen and evaluated by me.  Chart reviewed. Patient oriented to person only.  Pleasantly confused.  Disorganized through processes.      Interval Review of Systems:   General ROS: negative for - fever or malaise  Endocrine ROS: negative for - palpitations  Respiratory ROS: no cough, shortness of breath, or wheezing  Cardiovascular ROS: no chest pain or dyspnea on exertion  Gastrointestinal ROS: no abdominal pain,no black or bloody stools    /89 (BP Location: Right arm, Patient Position: Lying)   Pulse 80   Temp 98.1 °F (36.7 °C) (Temporal Artery )   Resp 18   Ht 167.6 cm (66\")   Wt 54.4 kg (120 lb)   SpO2 97%   BMI 19.37 kg/m²     Mental Status Exam  Mood: dysphoric  Affect: dysphoric   Thought Processes: tangential  Thought Content: negativistic  Hallucinations: no  Suicidal Thoughts: denies  Suicidal Plan/Intent:denies  Hopelesness:Mild  Homicidal Thoughts:  absent      Medical Decision Making:   Labs:     Lab Results (last 24 hours)     ** No results found for the last 24 hours. **            Radiology:     Imaging Results (last 24 hours)     ** No results found for the last 24 hours. **            EKG:     ECG/EMG Results (most recent)     Procedure Component Value Units Date/Time    ECG 12 Lead [[id number]] Collected:  10/02/18 0906     Updated:  10/02/18 2135    Narrative:       Test Reason : Potential adverse reaction to medications.  Blood Pressure : **/** mmHG  Vent. Rate : 072 BPM     Atrial Rate : 072 BPM     P-R Int : 164 ms          QRS Dur : 088 ms     "  QT Int : 410 ms       P-R-T Axes : 056 028 020 degrees     QTc Int : 448 ms    Normal sinus rhythm  Possible Left atrial enlargement  Borderline ECG  No previous ECGs available  Confirmed by Anderson Darden ([id number]) on 10/2/2018 9:35:17 PM    Referred By:  CASTRO           Confirmed By:Anderson Darden           Medications:     cholecalciferol 2,000 Units Oral Daily   donepezil 10 mg Oral Daily   escitalopram 10 mg Oral Daily   risperiDONE 0.5 mg Oral Q12H          All medications reviewed.      Assessment and Plan:    Assessment:   Assessment:s  Psychosis NOS provisional  Neurocognitive disorder        Continue plan per Dr. Klein: Risperdal 1 mg twice a day,  We will hold Aricept if patient has GI problems again.    Continue lexapro.     Continue hospitalization for safety and stabilization.  Continue current level of Special Precautions (q15 minute checks).

## 2018-10-08 VITALS
HEIGHT: 66 IN | TEMPERATURE: 98.4 F | RESPIRATION RATE: 18 BRPM | DIASTOLIC BLOOD PRESSURE: 79 MMHG | OXYGEN SATURATION: 95 % | HEART RATE: 78 BPM | WEIGHT: 120 LBS | SYSTOLIC BLOOD PRESSURE: 142 MMHG | BODY MASS INDEX: 19.29 KG/M2

## 2018-10-08 RX ORDER — HYDROXYZINE 50 MG/1
50 TABLET, FILM COATED ORAL DAILY PRN
Qty: 30 TABLET | Refills: 0 | Status: SHIPPED | OUTPATIENT
Start: 2018-10-08

## 2018-10-08 RX ORDER — RISPERIDONE 0.5 MG/1
0.5 TABLET ORAL EVERY 12 HOURS SCHEDULED
Qty: 60 TABLET | Refills: 0 | Status: SHIPPED | OUTPATIENT
Start: 2018-10-08

## 2018-10-08 RX ADMIN — DONEPEZIL HYDROCHLORIDE 10 MG: 5 TABLET, FILM COATED ORAL at 09:09

## 2018-10-08 RX ADMIN — RISPERIDONE 0.5 MG: 0.25 TABLET ORAL at 09:09

## 2018-10-08 RX ADMIN — ESCITALOPRAM 10 MG: 10 TABLET, FILM COATED ORAL at 09:09

## 2018-10-08 RX ADMIN — CHOLECALCIFEROL TAB 10 MCG (400 UNIT) 2000 UNITS: 10 TAB at 09:09

## 2018-10-08 NOTE — PROGRESS NOTES
"1045:    Therapist spoke with patient's friend, Giovany, via phone. Giovany appeared supportive. She reports that she visited the patient on Saturday and that she seemed \"very content.\"  Giovany at first stated that she was planning to visit a  to get guardianship of patient and inquired if patient could remain hospitalized for \"another week\" while she did this.  Giovany explained that it is difficult getting to appointments with the patient. Therapist educated Giovany about acute hospitalization and that patient appears stable to return home with supervision this date. She reports that she cares for the patient and that she is the only one that will take her in.  Therapist reviewed guardianship process.  Giovany reports that patient's son Sunny is current POA and may attempt to fight her for guardianship.       Therapist reviewed documentation from primary therapist Joana.  It appears that she talked to POA Sunny on 10/4 and received verbal consent for patient to return to [facility].  Therapist attempted contacted with POA again today; no answer.     Therapist reviewed patient's aftercare appointment with MAEGAN Felder on 10/09/18.  Giovany assures that home is safeguarded and is agreeable to patient returning home today.     Assisted patient's friend Giovany in identifying risk factors which would indicate the need for higher level of care including thoughts to harm self or others and/or self-harming behavior and encouraged patient to contact this office, call 911, or present to the nearest emergency room should any of these events occur. Discussed crisis intervention services and means to access.  Giovany verbalized understanding.   "

## 2018-10-08 NOTE — PROGRESS NOTES
"   LOS: 6 days   Patient Care Team:  Gabriela Uribe MD as PCP - General (Family Medicine)    Chief Complaint:  Patient says she is doing \"good\".  She denies depression or anxiety, answers \"no\" when asked if she is nervous and depressed, but she is unable to relate on a 1-10 scale.  She says she has slept \"very good\", she also says her appetite is \"very good.  She denies SI, HI, hallucinations or paranoia.  She is still disoriented to time and place.  I asked her if she is ready to go home, she says \" you can if you want to\"      Interval History:           Vital Signs    Temp:  [98.4 °F (36.9 °C)-98.5 °F (36.9 °C)] 98.4 °F (36.9 °C)  Heart Rate:  [] 78  Resp:  [18] 18  BP: (142-162)/(79-82) 142/79    Lab Results:   Lab Results (last 24 hours)     ** No results found for the last 24 hours. **           Labs:     Lab Results (last 24 hours)     ** No results found for the last 24 hours. **                        Exam:    Mental Status Exam:     Hygiene:   fair  Cooperation:  Cooperative  Eye Contact:  Downcast  Psychomotor Behavior:  Appropriate  Affect:  Restricted  Speech:  Normal  Thought Progress:  Goal directed  Thought Content:  Mood congurent  Suicidal:  None  Homicidal:  None  Hallucinations:  None  Delusion:  None  Memory:  Deficits  Orientation:  Person  Reliability:  fair  Insight:  Poor  Judgement:  Fair  Impulse Control:  Fair      Results Review:    Lab Results (last 24 hours)     ** No results found for the last 24 hours. **          Medication Review:  Hospital Medications (active)       Dose Frequency Start End    aluminum-magnesium hydroxide-simethicone (MAALOX MAX) 400-400-40 MG/5ML suspension 15 mL 15 mL Every 6 Hours PRN 10/2/2018     Sig - Route: Take 15 mL by mouth Every 6 (Six) Hours As Needed for Indigestion or Heartburn. - Oral    benzonatate (TESSALON) capsule 100 mg 100 mg 3 Times Daily PRN 10/2/2018     Sig - Route: Take 1 capsule by mouth 3 (Three) Times a Day As Needed for " "Cough. - Oral    benztropine (COGENTIN) injection 0.5 mg 0.5 mg Daily PRN 10/2/2018     Sig - Route: Inject 0.5 mL into the appropriate muscle as directed by prescriber Daily As Needed (Drug-induced extrapyramidal symptoms). - Intramuscular    Linked Group 1:  \"Or\" Linked Group Details        benztropine (COGENTIN) tablet 1 mg 1 mg Daily PRN 10/2/2018     Sig - Route: Take 1 tablet by mouth Daily As Needed (Drug-induced extrapyramidal symptoms). - Oral    Linked Group 1:  \"Or\" Linked Group Details        cholecalciferol (VITAMIN D3) tablet 2,000 Units 2,000 Units Daily 10/2/2018     Sig - Route: Take 5 tablets by mouth Daily. - Oral    diphenhydrAMINE (BENADRYL) capsule 25 mg 25 mg Every 4 Hours PRN 10/2/2018     Sig - Route: Take 1 capsule by mouth Every 4 (Four) Hours As Needed (Agitation). - Oral    donepezil (ARICEPT) tablet 10 mg 10 mg Daily 10/2/2018     Sig - Route: Take 2 tablets by mouth Daily. - Oral    escitalopram (LEXAPRO) tablet 10 mg 10 mg Daily 10/2/2018     Sig - Route: Take 1 tablet by mouth Daily. - Oral    famotidine (PEPCID) tablet 20 mg 20 mg 2 Times Daily PRN 10/2/2018     Sig - Route: Take 1 tablet by mouth 2 (Two) Times a Day As Needed for Heartburn. - Oral    haloperidol (HALDOL) tablet 2 mg 2 mg Every 4 Hours PRN 10/2/2018     Sig - Route: Take 1 tablet by mouth Every 4 (Four) Hours As Needed for Agitation. - Oral    hydrOXYzine (ATARAX) tablet 50 mg 50 mg Every 6 Hours PRN 10/2/2018     Sig - Route: Take 1 tablet by mouth Every 6 (Six) Hours As Needed for Anxiety. - Oral    ibuprofen (ADVIL,MOTRIN) tablet 600 mg 600 mg Every 6 Hours PRN 10/2/2018     Sig - Route: Take 1 tablet by mouth Every 6 (Six) Hours As Needed for Mild Pain  or Moderate Pain  (severe pain (7-10)). - Oral    loperamide (IMODIUM) capsule 2 mg 2 mg 4 Times Daily PRN 10/2/2018     Sig - Route: Take 1 capsule by mouth 4 (Four) Times a Day As Needed for Diarrhea. - Oral    LORazepam (ATIVAN) injection 1 mg 1 mg Every 4 " Hours PRN 10/2/2018     Sig - Route: Infuse 0.5 mL into a venous catheter Every 4 (Four) Hours As Needed for Agitation. - Intravenous    Notes to Pharmacy: May be given PO or IM    LORazepam (ATIVAN) tablet 1 mg 1 mg Every 4 Hours PRN 10/2/2018     Sig - Route: Take 1 tablet by mouth Every 4 (Four) Hours As Needed (Agitation). - Oral    Notes to Pharmacy: May be given PO or IM    magnesium hydroxide (MILK OF MAGNESIA) suspension 2400 mg/10mL 10 mL 10 mL Daily PRN 10/2/2018     Sig - Route: Take 10 mL by mouth Daily As Needed for Constipation. - Oral    ondansetron (ZOFRAN) tablet 4 mg 4 mg Every 6 Hours PRN 10/2/2018     Sig - Route: Take 1 tablet by mouth Every 6 (Six) Hours As Needed for Nausea or Vomiting. - Oral    risperiDONE (risperDAL) tablet 0.5 mg 0.5 mg Every 12 Hours Scheduled 10/3/2018     Sig - Route: Take 2 tablets by mouth Every 12 (Twelve) Hours. - Oral    sodium chloride (OCEAN) nasal spray 2 spray 2 spray As Needed 10/2/2018     Sig - Route: 2 sprays by Each Nare route As Needed for Congestion. - Each Nare    traZODone (DESYREL) tablet 50 mg 50 mg Nightly PRN 10/2/2018     Sig - Route: Take 1 tablet by mouth At Night As Needed for Sleep. - Oral             Assessment/Plan     Assessment: Assessment: Assessment: Assessment: Assessment/Diagnosis: Psychosi NOS provisional  Neurocognitive disorder      Treatment Plan: Patient has remained stable , we will refer to therapist to confirm discharge with her friend and possible discharge today           I have reviewed and approved the behavioral health treatment plans and problem list. Yes        Philomena Klein MD  10/08/18  10:08 AM

## 2018-10-10 NOTE — DISCHARGE SUMMARY
Date of Discharge:  10/10/2018    Presenting Problem/History of Present Illness  Major depressive disorder [F32.9]       Hospital Course  Patient was hospitalized on 10/2/2018  after she was brought to the emergency room by her friends because she became upset in the evening, became combative, refused to dress after a shower, ran through the house and was throwing things.  She was placed on special precautions level III and following home medications, vitamin D3 2000 units daily, Aricept 10 mg daily, Lexapro 10 mg daily.  I saw her on 10/2/2018 minute did initial history and physical examination continued same orders, patient was seen daily, patient had at least one episode where she required when necessary medication , but otherwise mental status remained stable and she continued good night's depression or anxiety , SI or HI, hallucinations or paranoia.,  Remained stable and about wanting to go home with mother, but was not able to give any information.about her address on her mother's name .her cognition remained impaired .   therapist was able to obtain information from the friend who patient had lived with since her recent discharge from [facility] , she continued to remain supportive and agreed to take patient home .patient was discharged with a friend on 10/8/2018 .she was to follow-up at [facility]. in [facility]   Procedures Performed         Consults:   Consults     No orders found from 9/3/2018 to 10/3/2018.          Pertinent Test Results: Results Review: CMP is essentially within normal limits   CBC is essentially within normal limits      Urinalysis session on 2+ leukocyte esterase, 6-12 WBCs. urine drug screen is positive for THC  EKG has shown normal sinus rhythm possible left atrial enlargement   CT of the head showed no acute intracranial pathology         Discharge Disposition  Home or Self Care    Discharge Medications     Your medication list      START taking  these medications      Instructions Last Dose Given Next Dose Due   hydrOXYzine 50 MG tablet  Commonly known as:  ATARAX      Take 1 tablet by mouth Daily As Needed for Anxiety for up to 30 days.       risperiDONE 0.5 MG tablet  Commonly known as:  risperDAL      Take 2 tablets by mouth Every 12 (Twelve) Hours for 30 days.          CONTINUE taking these medications      Instructions Last Dose Given Next Dose Due   donepezil 10 MG tablet  Commonly known as:  ARICEPT      Take 10 mg by mouth Daily.       escitalopram 10 MG tablet  Commonly known as:  LEXAPRO      Take 10 mg by mouth Daily.       FOSAMAX 70 MG tablet  Generic drug:  alendronate      Take 70 mg by mouth 1 (One) Time Per Week.       Vitamin D 2000 units tablet      Take 2,000 Units by mouth Daily.             Where to Get Your Medications      [facility] [id number]    Hours:  8AM-6PM Mon-Fri Phone:  [phone]   · hydrOXYzine 50 MG tablet  · risperiDONE 0.5 MG tablet         Lab Results (last 24 hours)     ** No results found for the last 24 hours. **        Follow-up Appointments  No future appointments.      Discharge Diagnosis: Assessment/Diagnosis: Psychosi NOS provisional     Dementia             Philomena Klein MD  10/10/18  1:04 PM

## 2025-02-13 NOTE — PLAN OF CARE
Uptrending Hgb A1C  Continue lifestyle modifications, encouraged food log and exercise  Rx for Mounjaro   May continue Jardiance  Encourage at least daily BG monitoring  RTC in 3 months for follow up     Orders:    POCT glycosylated hemoglobin (Hb A1C)    Lipid Panel; Future    Tirzepatide 2.5 MG/0.5ML SOAJ; Inject 2.5 mg into the skin every 7 days     Problem: Patient Care Overview  Goal: Plan of Care Review  Outcome: Ongoing (interventions implemented as appropriate)  PATIENT DENIES ANY PROBLEMS THIS SHIFT. PATIENT APPEARS TO BE THOUGHT BLOCKING. LATER IN SHIFT PATIENT APPEARS TO BE AGITATED AND IS PACING.  NEW ORDERS: VITAMIND D, ARICEPT 10, LEXAPRO 10.    Problem: Overarching Goals (Adult)  Goal: Adheres to Safety Considerations for Self and Others  Outcome: Ongoing (interventions implemented as appropriate)    Goal: Optimized Coping Skills in Response to Life Stressors  Outcome: Ongoing (interventions implemented as appropriate)    Goal: Develops/Participates in Therapeutic [place] to Support Successful Transition  Outcome: Ongoing (interventions implemented as appropriate)      Problem: Fall Risk (Adult)  Goal: Identify Related Risk Factors and Signs and Symptoms  Outcome: Ongoing (interventions implemented as appropriate)    Goal: Absence of Fall  Outcome: Ongoing (interventions implemented as appropriate)